# Patient Record
Sex: FEMALE | Race: WHITE | NOT HISPANIC OR LATINO | Employment: OTHER | URBAN - METROPOLITAN AREA
[De-identification: names, ages, dates, MRNs, and addresses within clinical notes are randomized per-mention and may not be internally consistent; named-entity substitution may affect disease eponyms.]

---

## 2019-12-09 ENCOUNTER — HOSPITAL ENCOUNTER (OUTPATIENT)
Facility: CLINIC | Age: 69
Setting detail: OBSERVATION
Discharge: HOME | DRG: 392 | End: 2019-12-10
Attending: EMERGENCY MEDICINE | Admitting: INTERNAL MEDICINE
Payer: COMMERCIAL

## 2019-12-09 ENCOUNTER — APPOINTMENT (OUTPATIENT)
Dept: GENERAL RADIOLOGY | Facility: CLINIC | Age: 69
DRG: 392 | End: 2019-12-09
Attending: EMERGENCY MEDICINE
Payer: COMMERCIAL

## 2019-12-09 DIAGNOSIS — R11.2 NAUSEA AND VOMITING, INTRACTABILITY OF VOMITING NOT SPECIFIED, UNSPECIFIED VOMITING TYPE: ICD-10-CM

## 2019-12-09 DIAGNOSIS — E87.1 HYPONATREMIA: ICD-10-CM

## 2019-12-09 DIAGNOSIS — I10 BENIGN ESSENTIAL HYPERTENSION: Primary | ICD-10-CM

## 2019-12-09 LAB
ALBUMIN SERPL-MCNC: 4 G/DL (ref 3.4–5)
ALBUMIN UR-MCNC: NEGATIVE MG/DL
ALP SERPL-CCNC: 88 U/L (ref 40–150)
ALT SERPL W P-5'-P-CCNC: 19 U/L (ref 0–50)
ANION GAP SERPL CALCULATED.3IONS-SCNC: 10 MMOL/L (ref 3–14)
APPEARANCE UR: CLEAR
AST SERPL W P-5'-P-CCNC: 28 U/L (ref 0–45)
BASOPHILS # BLD AUTO: 0 10E9/L (ref 0–0.2)
BASOPHILS NFR BLD AUTO: 0.3 %
BILIRUB SERPL-MCNC: 0.6 MG/DL (ref 0.2–1.3)
BILIRUB UR QL STRIP: NEGATIVE
BUN SERPL-MCNC: 10 MG/DL (ref 7–30)
CALCIUM SERPL-MCNC: 8.4 MG/DL (ref 8.5–10.1)
CHLORIDE SERPL-SCNC: 81 MMOL/L (ref 94–109)
CO2 SERPL-SCNC: 27 MMOL/L (ref 20–32)
COLOR UR AUTO: ABNORMAL
CREAT SERPL-MCNC: 0.69 MG/DL (ref 0.52–1.04)
DIFFERENTIAL METHOD BLD: ABNORMAL
EOSINOPHIL # BLD AUTO: 0 10E9/L (ref 0–0.7)
EOSINOPHIL NFR BLD AUTO: 0.3 %
ERYTHROCYTE [DISTWIDTH] IN BLOOD BY AUTOMATED COUNT: 12.4 % (ref 10–15)
GFR SERPL CREATININE-BSD FRML MDRD: 90 ML/MIN/{1.73_M2}
GLUCOSE SERPL-MCNC: 110 MG/DL (ref 70–99)
GLUCOSE UR STRIP-MCNC: NEGATIVE MG/DL
HCT VFR BLD AUTO: 40.1 % (ref 35–47)
HGB BLD-MCNC: 15 G/DL (ref 11.7–15.7)
HGB UR QL STRIP: NEGATIVE
IMM GRANULOCYTES # BLD: 0 10E9/L (ref 0–0.4)
IMM GRANULOCYTES NFR BLD: 0.3 %
KETONES UR STRIP-MCNC: 10 MG/DL
LEUKOCYTE ESTERASE UR QL STRIP: NEGATIVE
LIPASE SERPL-CCNC: 89 U/L (ref 73–393)
LYMPHOCYTES # BLD AUTO: 0.8 10E9/L (ref 0.8–5.3)
LYMPHOCYTES NFR BLD AUTO: 23.9 %
MCH RBC QN AUTO: 31.8 PG (ref 26.5–33)
MCHC RBC AUTO-ENTMCNC: 37.4 G/DL (ref 31.5–36.5)
MCV RBC AUTO: 85 FL (ref 78–100)
MONOCYTES # BLD AUTO: 0.4 10E9/L (ref 0–1.3)
MONOCYTES NFR BLD AUTO: 12.4 %
MUCOUS THREADS #/AREA URNS LPF: PRESENT /LPF
NEUTROPHILS # BLD AUTO: 2 10E9/L (ref 1.6–8.3)
NEUTROPHILS NFR BLD AUTO: 62.8 %
NITRATE UR QL: NEGATIVE
NRBC # BLD AUTO: 0 10*3/UL
NRBC BLD AUTO-RTO: 0 /100
PH UR STRIP: 6.5 PH (ref 5–7)
PLATELET # BLD AUTO: 200 10E9/L (ref 150–450)
POTASSIUM SERPL-SCNC: 2.8 MMOL/L (ref 3.4–5.3)
POTASSIUM SERPL-SCNC: 3.2 MMOL/L (ref 3.4–5.3)
PROT SERPL-MCNC: 7.5 G/DL (ref 6.8–8.8)
RBC # BLD AUTO: 4.72 10E12/L (ref 3.8–5.2)
RBC #/AREA URNS AUTO: 1 /HPF (ref 0–2)
SODIUM SERPL-SCNC: 118 MMOL/L (ref 133–144)
SODIUM SERPL-SCNC: 124 MMOL/L (ref 133–144)
SODIUM UR-SCNC: 32 MMOL/L
SOURCE: ABNORMAL
SP GR UR STRIP: 1.01 (ref 1–1.03)
SQUAMOUS #/AREA URNS AUTO: 4 /HPF (ref 0–1)
UROBILINOGEN UR STRIP-MCNC: NORMAL MG/DL (ref 0–2)
WBC # BLD AUTO: 3.2 10E9/L (ref 4–11)
WBC #/AREA URNS AUTO: 1 /HPF (ref 0–5)

## 2019-12-09 PROCEDURE — 86615 BORDETELLA ANTIBODY: CPT | Performed by: EMERGENCY MEDICINE

## 2019-12-09 PROCEDURE — 81001 URINALYSIS AUTO W/SCOPE: CPT | Performed by: EMERGENCY MEDICINE

## 2019-12-09 PROCEDURE — 80053 COMPREHEN METABOLIC PANEL: CPT | Performed by: EMERGENCY MEDICINE

## 2019-12-09 PROCEDURE — 99221 1ST HOSP IP/OBS SF/LOW 40: CPT | Mod: AI | Performed by: INTERNAL MEDICINE

## 2019-12-09 PROCEDURE — 36415 COLL VENOUS BLD VENIPUNCTURE: CPT | Performed by: INTERNAL MEDICINE

## 2019-12-09 PROCEDURE — 84132 ASSAY OF SERUM POTASSIUM: CPT | Performed by: INTERNAL MEDICINE

## 2019-12-09 PROCEDURE — 84295 ASSAY OF SERUM SODIUM: CPT | Performed by: INTERNAL MEDICINE

## 2019-12-09 PROCEDURE — G0378 HOSPITAL OBSERVATION PER HR: HCPCS

## 2019-12-09 PROCEDURE — 25800030 ZZH RX IP 258 OP 636: Performed by: EMERGENCY MEDICINE

## 2019-12-09 PROCEDURE — 25000128 H RX IP 250 OP 636: Performed by: EMERGENCY MEDICINE

## 2019-12-09 PROCEDURE — 36415 COLL VENOUS BLD VENIPUNCTURE: CPT | Performed by: EMERGENCY MEDICINE

## 2019-12-09 PROCEDURE — 96361 HYDRATE IV INFUSION ADD-ON: CPT

## 2019-12-09 PROCEDURE — 83690 ASSAY OF LIPASE: CPT | Performed by: EMERGENCY MEDICINE

## 2019-12-09 PROCEDURE — 99207 ZZC CDG-HISTORY COMP: MEETS EXP. PROB FOCUSED- DOWN CODED LACK OF PFSH: CPT | Performed by: INTERNAL MEDICINE

## 2019-12-09 PROCEDURE — 25000128 H RX IP 250 OP 636: Performed by: INTERNAL MEDICINE

## 2019-12-09 PROCEDURE — 85025 COMPLETE CBC W/AUTO DIFF WBC: CPT | Performed by: EMERGENCY MEDICINE

## 2019-12-09 PROCEDURE — 71046 X-RAY EXAM CHEST 2 VIEWS: CPT

## 2019-12-09 PROCEDURE — 25800030 ZZH RX IP 258 OP 636: Performed by: INTERNAL MEDICINE

## 2019-12-09 PROCEDURE — 96375 TX/PRO/DX INJ NEW DRUG ADDON: CPT

## 2019-12-09 PROCEDURE — 96376 TX/PRO/DX INJ SAME DRUG ADON: CPT

## 2019-12-09 PROCEDURE — 96365 THER/PROPH/DIAG IV INF INIT: CPT

## 2019-12-09 PROCEDURE — 83935 ASSAY OF URINE OSMOLALITY: CPT | Performed by: INTERNAL MEDICINE

## 2019-12-09 PROCEDURE — 25000132 ZZH RX MED GY IP 250 OP 250 PS 637: Performed by: EMERGENCY MEDICINE

## 2019-12-09 PROCEDURE — 84300 ASSAY OF URINE SODIUM: CPT | Performed by: INTERNAL MEDICINE

## 2019-12-09 PROCEDURE — 99285 EMERGENCY DEPT VISIT HI MDM: CPT | Mod: 25

## 2019-12-09 PROCEDURE — 25000125 ZZHC RX 250: Performed by: EMERGENCY MEDICINE

## 2019-12-09 RX ORDER — ONDANSETRON 4 MG/1
4 TABLET, ORALLY DISINTEGRATING ORAL EVERY 6 HOURS PRN
Status: DISCONTINUED | OUTPATIENT
Start: 2019-12-09 | End: 2019-12-10

## 2019-12-09 RX ORDER — SCOLOPAMINE TRANSDERMAL SYSTEM 1 MG/1
1 PATCH, EXTENDED RELEASE TRANSDERMAL
Status: DISCONTINUED | OUTPATIENT
Start: 2019-12-09 | End: 2019-12-10 | Stop reason: HOSPADM

## 2019-12-09 RX ORDER — ONDANSETRON 4 MG/1
4 TABLET, ORALLY DISINTEGRATING ORAL EVERY 6 HOURS PRN
Status: DISCONTINUED | OUTPATIENT
Start: 2019-12-09 | End: 2019-12-10 | Stop reason: HOSPADM

## 2019-12-09 RX ORDER — ONDANSETRON 2 MG/ML
4 INJECTION INTRAMUSCULAR; INTRAVENOUS EVERY 30 MIN PRN
Status: DISCONTINUED | OUTPATIENT
Start: 2019-12-09 | End: 2019-12-10

## 2019-12-09 RX ORDER — NALOXONE HYDROCHLORIDE 0.4 MG/ML
.1-.4 INJECTION, SOLUTION INTRAMUSCULAR; INTRAVENOUS; SUBCUTANEOUS
Status: DISCONTINUED | OUTPATIENT
Start: 2019-12-09 | End: 2019-12-10 | Stop reason: HOSPADM

## 2019-12-09 RX ORDER — POTASSIUM CL/LIDO/0.9 % NACL 10MEQ/0.1L
10 INTRAVENOUS SOLUTION, PIGGYBACK (ML) INTRAVENOUS
Status: DISCONTINUED | OUTPATIENT
Start: 2019-12-09 | End: 2019-12-09

## 2019-12-09 RX ORDER — METOCLOPRAMIDE 5 MG/1
5 TABLET ORAL EVERY 6 HOURS PRN
Status: DISCONTINUED | OUTPATIENT
Start: 2019-12-09 | End: 2019-12-10 | Stop reason: HOSPADM

## 2019-12-09 RX ORDER — POTASSIUM CHLORIDE 1.5 G/1.58G
20-40 POWDER, FOR SOLUTION ORAL
Status: DISCONTINUED | OUTPATIENT
Start: 2019-12-09 | End: 2019-12-10 | Stop reason: HOSPADM

## 2019-12-09 RX ORDER — ONDANSETRON 2 MG/ML
4 INJECTION INTRAMUSCULAR; INTRAVENOUS EVERY 6 HOURS PRN
Status: DISCONTINUED | OUTPATIENT
Start: 2019-12-09 | End: 2019-12-10

## 2019-12-09 RX ORDER — SODIUM CHLORIDE 9 MG/ML
1000 INJECTION, SOLUTION INTRAVENOUS CONTINUOUS
Status: DISCONTINUED | OUTPATIENT
Start: 2019-12-09 | End: 2019-12-10 | Stop reason: HOSPADM

## 2019-12-09 RX ORDER — POTASSIUM CHLORIDE 1500 MG/1
20-40 TABLET, EXTENDED RELEASE ORAL
Status: DISCONTINUED | OUTPATIENT
Start: 2019-12-09 | End: 2019-12-10 | Stop reason: HOSPADM

## 2019-12-09 RX ORDER — ATENOLOL 25 MG/1
25 TABLET ORAL DAILY
Status: DISCONTINUED | OUTPATIENT
Start: 2019-12-10 | End: 2019-12-10 | Stop reason: HOSPADM

## 2019-12-09 RX ORDER — SODIUM CHLORIDE 9 MG/ML
INJECTION, SOLUTION INTRAVENOUS ONCE
Status: COMPLETED | OUTPATIENT
Start: 2019-12-09 | End: 2019-12-09

## 2019-12-09 RX ORDER — POTASSIUM CHLORIDE 1.5 G/1.58G
20 POWDER, FOR SOLUTION ORAL ONCE
Status: COMPLETED | OUTPATIENT
Start: 2019-12-09 | End: 2019-12-09

## 2019-12-09 RX ORDER — SODIUM CHLORIDE 9 MG/ML
1000 INJECTION, SOLUTION INTRAVENOUS CONTINUOUS
Status: DISCONTINUED | OUTPATIENT
Start: 2019-12-09 | End: 2019-12-09

## 2019-12-09 RX ORDER — ONDANSETRON 2 MG/ML
INJECTION INTRAMUSCULAR; INTRAVENOUS
Status: DISCONTINUED
Start: 2019-12-09 | End: 2019-12-09 | Stop reason: HOSPADM

## 2019-12-09 RX ORDER — POTASSIUM CL/LIDO/0.9 % NACL 10MEQ/0.1L
10 INTRAVENOUS SOLUTION, PIGGYBACK (ML) INTRAVENOUS
Status: DISCONTINUED | OUTPATIENT
Start: 2019-12-09 | End: 2019-12-10 | Stop reason: HOSPADM

## 2019-12-09 RX ORDER — ACETAMINOPHEN 650 MG/1
650 SUPPOSITORY RECTAL EVERY 4 HOURS PRN
Status: DISCONTINUED | OUTPATIENT
Start: 2019-12-09 | End: 2019-12-10 | Stop reason: HOSPADM

## 2019-12-09 RX ORDER — PROCHLORPERAZINE 25 MG
12.5 SUPPOSITORY, RECTAL RECTAL EVERY 12 HOURS PRN
Status: DISCONTINUED | OUTPATIENT
Start: 2019-12-09 | End: 2019-12-10 | Stop reason: HOSPADM

## 2019-12-09 RX ORDER — POTASSIUM CHLORIDE 7.45 MG/ML
10 INJECTION INTRAVENOUS
Status: DISCONTINUED | OUTPATIENT
Start: 2019-12-09 | End: 2019-12-10 | Stop reason: HOSPADM

## 2019-12-09 RX ORDER — SCOLOPAMINE TRANSDERMAL SYSTEM 1 MG/1
1 PATCH, EXTENDED RELEASE TRANSDERMAL
Status: DISCONTINUED | OUTPATIENT
Start: 2019-12-09 | End: 2019-12-09

## 2019-12-09 RX ORDER — MAGNESIUM SULFATE HEPTAHYDRATE 40 MG/ML
4 INJECTION, SOLUTION INTRAVENOUS EVERY 4 HOURS PRN
Status: DISCONTINUED | OUTPATIENT
Start: 2019-12-09 | End: 2019-12-10 | Stop reason: HOSPADM

## 2019-12-09 RX ORDER — ACETAMINOPHEN 325 MG/1
650 TABLET ORAL EVERY 4 HOURS PRN
Status: DISCONTINUED | OUTPATIENT
Start: 2019-12-09 | End: 2019-12-10 | Stop reason: HOSPADM

## 2019-12-09 RX ORDER — PROCHLORPERAZINE MALEATE 5 MG
5 TABLET ORAL EVERY 6 HOURS PRN
Status: DISCONTINUED | OUTPATIENT
Start: 2019-12-09 | End: 2019-12-10 | Stop reason: HOSPADM

## 2019-12-09 RX ORDER — POTASSIUM CHLORIDE 29.8 MG/ML
20 INJECTION INTRAVENOUS
Status: DISCONTINUED | OUTPATIENT
Start: 2019-12-09 | End: 2019-12-10 | Stop reason: HOSPADM

## 2019-12-09 RX ORDER — METOCLOPRAMIDE HYDROCHLORIDE 5 MG/ML
5 INJECTION INTRAMUSCULAR; INTRAVENOUS EVERY 6 HOURS PRN
Status: DISCONTINUED | OUTPATIENT
Start: 2019-12-09 | End: 2019-12-10 | Stop reason: HOSPADM

## 2019-12-09 RX ORDER — ONDANSETRON 2 MG/ML
4 INJECTION INTRAMUSCULAR; INTRAVENOUS EVERY 6 HOURS PRN
Status: DISCONTINUED | OUTPATIENT
Start: 2019-12-09 | End: 2019-12-10 | Stop reason: HOSPADM

## 2019-12-09 RX ORDER — SIMVASTATIN 20 MG
20 TABLET ORAL DAILY
Status: DISCONTINUED | OUTPATIENT
Start: 2019-12-10 | End: 2019-12-10 | Stop reason: HOSPADM

## 2019-12-09 RX ORDER — LEVOTHYROXINE SODIUM 112 UG/1
112 TABLET ORAL DAILY
Status: DISCONTINUED | OUTPATIENT
Start: 2019-12-10 | End: 2019-12-10 | Stop reason: HOSPADM

## 2019-12-09 RX ADMIN — POTASSIUM CHLORIDE 20 MEQ: 1.5 POWDER, FOR SOLUTION ORAL at 17:31

## 2019-12-09 RX ADMIN — ONDANSETRON HYDROCHLORIDE 4 MG: 2 INJECTION, SOLUTION INTRAMUSCULAR; INTRAVENOUS at 19:51

## 2019-12-09 RX ADMIN — SODIUM CHLORIDE: 9 INJECTION, SOLUTION INTRAVENOUS at 18:54

## 2019-12-09 RX ADMIN — Medication 10 MEQ: at 23:38

## 2019-12-09 RX ADMIN — SODIUM CHLORIDE 1000 ML: 9 INJECTION, SOLUTION INTRAVENOUS at 17:32

## 2019-12-09 RX ADMIN — ONDANSETRON HYDROCHLORIDE 4 MG: 2 INJECTION, SOLUTION INTRAMUSCULAR; INTRAVENOUS at 16:13

## 2019-12-09 RX ADMIN — SODIUM CHLORIDE 1000 ML: 9 INJECTION, SOLUTION INTRAVENOUS at 22:40

## 2019-12-09 RX ADMIN — SODIUM CHLORIDE 1000 ML: 9 INJECTION, SOLUTION INTRAVENOUS at 16:13

## 2019-12-09 RX ADMIN — SCOPOLAMINE 1 PATCH: 1 PATCH TRANSDERMAL at 18:05

## 2019-12-09 RX ADMIN — Medication 10 MEQ: at 17:31

## 2019-12-09 ASSESSMENT — ENCOUNTER SYMPTOMS
HEADACHES: 1
FEVER: 1
COUGH: 1
ABDOMINAL PAIN: 0
VOMITING: 1
APPETITE CHANGE: 1
RHINORRHEA: 1
NAUSEA: 1

## 2019-12-09 ASSESSMENT — MIFFLIN-ST. JEOR: SCORE: 1116.03

## 2019-12-09 NOTE — ED PROVIDER NOTES
"  History     Chief Complaint:  Nausea    HPI   Cande Glynn is a 68 year old female with a history of hypertension, thyroid disease, and hyponatremia who presents to the emergency department today for evaluation of nausea. The patient reports five days of a productive cough and cold symptoms accompanied by four days of a suspected fever, a headache, constant, \"severe\" nausea, and gagging/dry heaving. She adds that she is unable to eat and only minimally drink due to the gagging and dry heaving. The patient endorses utilizing Pepto bismol with no relief of her symptoms. She reports a history of similar symptoms approximately three years ago for which she was hospitalized. See note below for further details. The patient denies any abdominal pain or history of lung disease. Of note, the patient received a flu shot this year.     Per chart review, the patient was admitted to the hospital from 10/19/16-10/20/16 for three days of intractable nausea and vomiting. ED work up revealed hyponatremia to 120 and hypokalemia to 3.3. Symptoms were attributed to likely acute gastroenteritis and were well controlled with scopolamine patch (failed zofran, compazine). Potassium was supplemented during her visit.     Allergies:  No Known Drug Allergies     Medications:    Tenormin  Levothroid  Hyzaar  Zocor  Tigan     Past Medical History:    Hypertension  Thyroid disease  Hyponatremia   Hypokalemia  Denies history of lung disease    Past Surgical History:    Surgical history reviewed. No pertinent surgical history.    Family History:    Family history reviewed. No pertinent family history.    Social History:  The patient was accompanied to the ED by a male .  [facility]  Marital Status:        Review of Systems   Constitutional: Positive for appetite change and fever.   HENT: Positive for congestion and rhinorrhea.    Respiratory: Positive for cough.    Gastrointestinal: Positive for nausea " "and vomiting. Negative for abdominal pain.   Neurological: Positive for headaches.   All other systems reviewed and are negative.  Patient states that she has had nausea since Friday and has been \"dry heaving\". Complains of 5/10 Headache also. Complains of dizziness and poor appetite. Denies abdominal pain.    Physical Exam     Patient Vitals for the past 24 hrs:   BP Temp Temp src Pulse Heart Rate Resp SpO2 Weight   12/09/19 2015 135/67 -- -- 59 59 -- 97 % --   12/09/19 2000 137/64 -- -- 54 53 -- 91 % --   12/09/19 1945 135/64 -- -- 57 57 -- 93 % --   12/09/19 1930 122/63 -- -- 60 65 -- 91 % --   12/09/19 1915 138/70 -- -- 60 56 -- 94 % --   12/09/19 1900 (!) 149/67 -- -- 64 56 -- 95 % --   12/09/19 1830 129/55 -- -- 55 56 -- 94 % --   12/09/19 1815 138/76 -- -- 61 56 -- 93 % --   12/09/19 1800 (!) 141/70 -- -- 56 57 -- 95 % --   12/09/19 1745 130/69 -- -- 55 58 -- 95 % --   12/09/19 1730 136/68 -- -- 58 55 -- 97 % --   12/09/19 1715 130/60 -- -- 54 -- -- 91 % --   12/09/19 1700 (!) 142/77 -- -- 59 -- -- -- --   12/09/19 1645 (!) 144/65 -- -- 56 -- -- 98 % --   12/09/19 1630 134/60 -- -- 50 -- -- 96 % --   12/09/19 1625 -- -- -- -- -- -- 96 % --   12/09/19 1620 -- -- -- -- -- -- 96 % --   12/09/19 1615 (!) 144/67 -- -- 54 -- -- 91 % --   12/09/19 1522 125/73 97.4  F (36.3  C) Temporal 63 -- 16 99 % 60.1 kg (132 lb 7.9 oz)     Physical Exam  GEN: patient appears tired, SO at the bedside  HEAD: atraumatic, normocephalic  EYES: pupils reactive, conjunctivae normal  ENT: TMs flat and white bilaterally, oropharynx normal with no erythema or exudate, mucus membranes dry  NECK: no cervical LAD, no meningeal signs  RESPIRATORY: no tachypnea, breath sounds clear to auscultation (no rales, wheezes, rhonchi)  CVS: normal S1/S2, I/VI systolic ej murmurs, no rubs/gallops  ABDOMEN: soft, nontender, no masses or organomegaly, no rebound, decreased bowel sounds  BACK: no spinal tenderness  EXTREMITIES: intact pulses x 2 (full ROM " at the joints), no edema  MUSCULOSKELETAL: no deformities  SKIN: warm and dry, no acute rashes  NEURO: GCS 15, cranial nerves intact.  Motor- moves all 4 extremities  Overall symmetrical exam  HEME: no bruising or petechiae/contusions  LYMPH: no lymphadenopathy    Emergency Department Course     Imaging:  Radiology findings were communicated with the patient who voiced understanding of the findings.    Chest XR,  PA & LAT  Lungs hyperinflated but clear. Otherwise negative Chest.  Reading per radiology    Laboratory:  Laboratory findings were communicated with the patient who voiced understanding of the findings.    CBC: WBC 3.2 (L), HGB 15.0,   CMP:  (LL), Potassium 2.8 (L), Chloride 81 (L), Glucose 110 (H), Calcium 8.4 (L) o/w WNL (Creatinine 0.68)  Lipase: 89  Bordetalla pertussis Mynor IgG with Reflex: pending    UA with Microscopic: Ketones 10 (A), Squamous Epithelial 4 (H), Mucous present (A), o/w WNL     Interventions:  1613 NS 1000 ml IV  1613 Zofran 4 mg IV  1731 Klor-con 20 mEq Oral  1731 Potassium Chloride 10 mEq IV  1732 NS 1000 ml IV  1805 Scopolamine 1 patch Transdermal  1854 NS 1000 ml IV  1951 Zofran 4 mg IV  Heplock  Cardiac/Sp02 monitoring    Emergency Department Course:    1554 Nursing notes and vitals reviewed.    1603 I performed an exam of the patient as documented above.     1612 IV was inserted and blood was drawn for laboratory testing, results above.    1626 The patient was sent for a chest xray while in the emergency department, results above.     1731 The patient provided a urine sample here in the emergency department. This was sent for laboratory testing, findings above.    1908 I spoke with Dr. Sharma of the hospitalist service from [facility] regarding patient's presentation, findings, and plan of care.    Findings and plan explained to the Patient who consents to admission. Discussed the patient with Dr. Sharma, who will admit the patient to an observation bed for  "further monitoring, evaluation, and treatment.    BP (!) 144/56 (BP Location: Left arm)   Pulse 59   Temp 98.2  F (36.8  C) (Oral)   Resp 14   Ht 1.626 m (5' 4\")   Wt 59.6 kg (131 lb 6.4 oz)   SpO2 94%   BMI 22.55 kg/m        Impression & Plan      Medical Decision Making:  Cande Glynn is a 68 year old female who has had to be admitted with dizziness in the past and additional nausea. The patient was discharged back in 2016 with a gastroenteritis, hyponatremia, and hypokalemia. The patient has been sick and coughing for the last few days and has post-tussive emesis. She did appear weak and tired. An IV was placed and labs were sent. Her white cell count is 3.2. Potassium is 2.8, which is markedly low. Sodium is 118. Lipase is negative, showing no pancreatitis. IV fluids were administrated, plus oral and IV potassium repletion.  Zofran was given for nausea with no relief.  Scopolamine provided major improved.    The patient was able to urinate for us.  Chest xray does not show any abnormality. I have sent off a pertussis, which is currently pending. The patient does not necessarily have flu criteria. She is getting IV fluids in addition to potassium orally and IV. She will be admitted for observation.     Addendum: No infection noted on the urine, small amount of ketones.  Patient improved but will be admitted for hydration and observation.    Diagnosis:    ICD-10-CM    1. Hyponatremia E87.1 UA with Microscopic     Bordetalla pertussis Teresa IgG with Reflex     Bordetalla pertussis Teresa IgG with Reflex     Bordetalla pertussis Teresa IgG with Reflex     CANCELED: Bordetalla pertussis Teresa IgG with Reflex     CANCELED: Bordetalla pertussis Teresa IgG with Reflex   2. Nausea and vomiting, intractability of vomiting not specified, unspecified vomiting type R11.2      Disposition:   The patient is admitted into the care of Dr. Sharma.    Scribe Disclosure:  Rosalina BELTRÁN, am serving as a scribe at 3:57 PM on 12/9/2019 " to document services personally performed by Alana Small MD based on my observations and the provider's statements to me.    [facility] EMERGENCY DEPARTMENT       Alana Small MD  12/09/19 [id number]

## 2019-12-09 NOTE — ED TRIAGE NOTES
"Patient states that she has had nausea since Friday and has bee \"dry heaving\". Complains of 5/10 Headache also. Complains of dizziness and poor appetite. Denies abdominal pain.  "

## 2019-12-10 VITALS
DIASTOLIC BLOOD PRESSURE: 45 MMHG | HEIGHT: 64 IN | HEART RATE: 51 BPM | WEIGHT: 131.4 LBS | OXYGEN SATURATION: 94 % | SYSTOLIC BLOOD PRESSURE: 124 MMHG | TEMPERATURE: 97.6 F | RESPIRATION RATE: 16 BRPM | BODY MASS INDEX: 22.43 KG/M2

## 2019-12-10 LAB
ANION GAP SERPL CALCULATED.3IONS-SCNC: 7 MMOL/L (ref 3–14)
BUN SERPL-MCNC: 7 MG/DL (ref 7–30)
CALCIUM SERPL-MCNC: 7.7 MG/DL (ref 8.5–10.1)
CHLORIDE SERPL-SCNC: 95 MMOL/L (ref 94–109)
CO2 SERPL-SCNC: 25 MMOL/L (ref 20–32)
CREAT SERPL-MCNC: 0.63 MG/DL (ref 0.52–1.04)
GFR SERPL CREATININE-BSD FRML MDRD: >90 ML/MIN/{1.73_M2}
GLUCOSE SERPL-MCNC: 89 MG/DL (ref 70–99)
OSMOLALITY UR: 247 MMOL/KG (ref 100–1200)
POTASSIUM SERPL-SCNC: 3.4 MMOL/L (ref 3.4–5.3)
SODIUM SERPL-SCNC: 126 MMOL/L (ref 133–144)
SODIUM SERPL-SCNC: 126 MMOL/L (ref 133–144)
SODIUM SERPL-SCNC: 127 MMOL/L (ref 133–144)

## 2019-12-10 PROCEDURE — 96361 HYDRATE IV INFUSION ADD-ON: CPT

## 2019-12-10 PROCEDURE — 96376 TX/PRO/DX INJ SAME DRUG ADON: CPT

## 2019-12-10 PROCEDURE — 25800030 ZZH RX IP 258 OP 636: Performed by: INTERNAL MEDICINE

## 2019-12-10 PROCEDURE — G0378 HOSPITAL OBSERVATION PER HR: HCPCS

## 2019-12-10 PROCEDURE — 84295 ASSAY OF SERUM SODIUM: CPT | Performed by: PHYSICIAN ASSISTANT

## 2019-12-10 PROCEDURE — 36415 COLL VENOUS BLD VENIPUNCTURE: CPT | Performed by: INTERNAL MEDICINE

## 2019-12-10 PROCEDURE — 84295 ASSAY OF SERUM SODIUM: CPT | Performed by: INTERNAL MEDICINE

## 2019-12-10 PROCEDURE — 80048 BASIC METABOLIC PNL TOTAL CA: CPT | Performed by: INTERNAL MEDICINE

## 2019-12-10 PROCEDURE — 36415 COLL VENOUS BLD VENIPUNCTURE: CPT | Performed by: PHYSICIAN ASSISTANT

## 2019-12-10 PROCEDURE — 25000128 H RX IP 250 OP 636: Performed by: INTERNAL MEDICINE

## 2019-12-10 PROCEDURE — 25000132 ZZH RX MED GY IP 250 OP 250 PS 637: Performed by: INTERNAL MEDICINE

## 2019-12-10 PROCEDURE — 99217 ZZC OBSERVATION CARE DISCHARGE: CPT | Performed by: PHYSICIAN ASSISTANT

## 2019-12-10 RX ORDER — ONDANSETRON 4 MG/1
4 TABLET, ORALLY DISINTEGRATING ORAL EVERY 6 HOURS PRN
Qty: 10 TABLET | Refills: 0 | Status: ON HOLD | OUTPATIENT
Start: 2019-12-10 | End: 2019-12-15

## 2019-12-10 RX ORDER — LOSARTAN POTASSIUM 100 MG/1
100 TABLET ORAL DAILY
Qty: 30 TABLET | Refills: 0 | Status: SHIPPED | OUTPATIENT
Start: 2019-12-11

## 2019-12-10 RX ADMIN — SODIUM CHLORIDE 1000 ML: 9 INJECTION, SOLUTION INTRAVENOUS at 00:59

## 2019-12-10 RX ADMIN — ATENOLOL 25 MG: 25 TABLET ORAL at 08:16

## 2019-12-10 RX ADMIN — SIMVASTATIN 20 MG: 20 TABLET, FILM COATED ORAL at 08:16

## 2019-12-10 RX ADMIN — Medication 10 MEQ: at 03:50

## 2019-12-10 RX ADMIN — Medication 10 MEQ: at 02:24

## 2019-12-10 RX ADMIN — LEVOTHYROXINE SODIUM 112 MCG: 112 TABLET ORAL at 08:15

## 2019-12-10 RX ADMIN — Medication 10 MEQ: at 00:57

## 2019-12-10 NOTE — DISCHARGE SUMMARY
Discharge Summary  Hospitalist Service    Cande Glynn MRN# [id number]   YOB: 1950 Age: 68 year old     Date of Admission:  12/9/2019  Date of Discharge:  12/10/2019  Admitting Physician: Guevara Sharma MD  Discharge Physician: Ana Maria Malloy PA-C  Discharging Service: Hospitalist Service     Primary Provider: Vivien Wagner  Primary Care Physician Phone Number: [phone]         Discharge Diagnoses/Problem Oriented Hospital Course (Providers):    Cande Glynn was admitted on 12/9/2019 by Guevara Sharma MD and I would refer you to their history and physical.  Briefly, patient presented with persistent nausea without vomiting or significant abdominal pain.  Electrolyte panel showed a low sodium at 118 and low potassium at 2.8 likely contributing to her symptoms.  She was given normal saline, electrolyte replacement and antiemetics with improvement of her symptoms.  The following problems were addressed during her hospitalization:      #Severe hyponatremia  Presented with persistent nausea for multiple days without vomiting or abdominal pain.  She continued to take her blood pressure medication which included hydrochlorothiazide during this time likely contributing to the hyponatremia.  Her sodium on admission was 118 but with normal saline and antiemetics and improved to 126.  She was feeling better at the time of discharge so her diuretic was held and she was told to follow-up with her primary care doctor later this week for repeat sodium level.    #Nausea  Likely related to hyponatremia and hypokalemia.  Symptoms improved with nausea medication.  She was discharged with a prescription of Zofran    #Hypokalemia  This was replaced with improvement of her symptoms    #Cough  She is a daily tobacco user and also presented with cough productive of clear sputum.  She was afebrile without elevation or WBC.  Chest x-ray appeared clear so no antibiotic treatment was  started.    #Hypertension  On admission patient takes a combination losartan/hydrochlorothiazide she was discontinued given the hyponatremia.  -Discharged with losartan 100 mg prescription and she will continue her atenolol daily    No other work-up was done on this admission and she was discharged home on all of her other medications           Code Status:      Full Code        Brief Hospital Stay Summary Sent Home With Patient in AVS:        Reason for your hospital stay      You were admitted for concerns of persistent nausea which caused her   sodium level to be low.  We treated your nausea with antiemetics and your   sodium level with normal saline.  Your symptoms are much improved but your   sodium is still slightly low at 126 when normal is 135.  We would like you   to stop taking your losartan/hydrochlorothiazide blood pressure medication   and instead take the new losartan medication we sent to the pharmacy.  The   hydrochlorothiazide portion can cause low sodium levels in the setting of   dehydration.    We recommend you continue to drink fluids throughout the day and follow-up   with your clinic at the end of the week for a repeat sodium level.                           Pending Results:        Unresulted Labs Ordered in the Past 30 Days of this Admission     Date and Time Order Name Status Description    12/9/2019 1810 Bordetalla pertussis Teresa IgG with Reflex In process             Discharge Instructions and Follow-Up:      Follow-up Appointments     Follow-up and recommended labs and tests       Follow-up with your primary care clinic at the end of the week for a   repeat BMP               Discharge Disposition:      Discharged to home         Discharge Medications:        Current Discharge Medication List      START taking these medications    Details   losartan (COZAAR) 100 MG tablet Take 1 tablet (100 mg) by mouth daily  Qty: 30 tablet, Refills: 0    Associated Diagnoses: Benign essential hypertension  "     ondansetron (ZOFRAN-ODT) 4 MG ODT tab Take 1 tablet (4 mg) by mouth every 6 hours as needed for nausea or vomiting  Qty: 10 tablet, Refills: 0    Associated Diagnoses: Nausea and vomiting, intractability of vomiting not specified, unspecified vomiting type         CONTINUE these medications which have NOT CHANGED    Details   atenolol (TENORMIN) 25 MG tablet Take 25 mg by mouth daily      Levothyroxine Sodium (LEVOTHROID PO) Take 112 mcg by mouth daily       simvastatin (ZOCOR) 20 MG tablet Take 20 mg by mouth daily         STOP taking these medications       losartan-hydrochlorothiazide (HYZAAR) 100-12.5 MG per tablet Comments:   Reason for Stopping:                 Allergies:       No Known Allergies        Consultations This Hospital Stay:      No consultations were requested during this admission         Condition and Physical on Discharge:      Discharge condition: Stable   Vitals: Blood pressure 133/48, pulse 55, temperature 98.5  F (36.9  C), temperature source Oral, resp. rate 16, height 1.626 m (5' 4\"), weight 59.6 kg (131 lb 6.4 oz), SpO2 92 %.     Constitutional:  Alert and oriented x3   Lungs:  Clear to auscultation bilaterally   Cardiovascular:  RRR with no murmur   Abdomen:  Bowel sounds are present with no tenderness   Skin:  No rash or open sores are noted   Other:          Discharge Time:      Less than 30 minutes.        Image Results From This Hospital Stay (For Non-EPIC Providers):        Results for orders placed or performed during the hospital encounter of 12/09/19   Chest XR,  PA & LAT    Narrative    EXAM: XR CHEST 2 VW  LOCATION: [facility]  DATE/TIME: 12/9/2019 4:55 PM    INDICATION: Cough  COMPARISON: None.      Impression    IMPRESSION: Lungs hyperinflated but clear. Otherwise negative Chest.           Most Recent Lab Results In EPIC (For Non-EPIC Providers):    Most Recent 3 CBC's:  Recent Labs   Lab Test 12/09/19  1612 10/19/16  1503   WBC 3.2* 9.0   HGB 15.0 15.4 "   MCV 85 84    277      Most Recent 3 BMP's:  Recent Labs   Lab Test 12/10/19  1050 12/10/19  0512 12/10/19  0148 12/09/19  2147 12/09/19  1612 10/20/16  0655   * 127* 126* 124* 118* 127*   POTASSIUM  --  3.4  --  3.2* 2.8* 3.2*   CHLORIDE  --  95  --   --  81* 95   CO2  --  25  --   --  27 27   BUN  --  7  --   --  10 9   CR  --  0.63  --   --  0.69 0.80   ANIONGAP  --  7  --   --  10 5   ZORAIDA  --  7.7*  --   --  8.4* 7.4*   GLC  --  89  --   --  110* 95     Most Recent 3 Troponin's:No lab results found.  Most Recent 3 INR's:No lab results found.  Most Recent 2 LFT's:  Recent Labs   Lab Test 12/09/19  1612   AST 28   ALT 19   ALKPHOS 88   BILITOTAL 0.6     Most Recent Cholesterol Panel:No lab results found.  Most Recent 6 Bacteria Isolates From Any Culture (See EPIC Reports for Culture Details):No lab results found.  Most Recent TSH, T4 and HgbA1c: No lab results found.

## 2019-12-10 NOTE — PHARMACY-ADMISSION MEDICATION HISTORY
Admission medication history interview status for this patient is complete. See [facility] admission navigator for allergy information, prior to admission medications and immunization status.     Medication history interview source(s):Patient  Medication history resources (including written lists, pill bottles, clinic record):None  Primary pharmacy: [facility]    Changes made to PTA medication list:  Added: None  Deleted: Scopalamine patch; Trimethobenzamide;   Changed: None    Actions taken by pharmacist (provider contacted, etc):None     Additional medication history information:None    Medication reconciliation/reorder completed by provider prior to medication history?  No     Do you take OTC medications (eg tylenol, ibuprofen, fish oil, eye/ear drops, etc)? No         Prior to Admission medications    Medication Sig Last Dose Taking? Auth Provider   atenolol (TENORMIN) 25 MG tablet Take 25 mg by mouth daily 12/9/2019 at AM Yes Unknown, Entered By History   Levothyroxine Sodium (LEVOTHROID PO) Take 112 mcg by mouth daily  12/9/2019 at AM Yes Reported, Patient   losartan-hydrochlorothiazide (HYZAAR) 100-12.5 MG per tablet Take 1 tablet by mouth daily 12/9/2019 at AM Yes Unknown, Entered By History   simvastatin (ZOCOR) 20 MG tablet Take 20 mg by mouth daily 12/9/2019 at AM Yes Unknown, Entered By History

## 2019-12-10 NOTE — H&P
Hospitalist Observation Admission Note([facility])    Name: Cande Glynn    MRN: [id number]          YOB: 1950    Age: 68 year old    Date of admission: 12/9/2019    Primary care provider: Vivien Wagner  Admitting physician:Guevara Sharma MD                 Assessment      Brief summary of admission assessment:Cande Glynn is a 68 year old  female with a significant past medical history of hypertension and thyroid disease who presents with persistent nausea since Friday.  Patient did not vomit but she was not able to take enough oral intake since onset of her nausea.  She has been having symptoms of upper respiratory infection over the last several days as well.    ED evaluation revealed stable vital signs but her sodium level was 118 and potassium was 2.8 with normal kidney function.    Hospital service was consulted for admission to the hospital.        #1.  Upper respiratory infection: Improved  #2.  Nausea and decreased oral intake  #3.  Severe hyponatremia: Patient has similar history of hyponatremia in 2016.  Suspect this is dehydration related to nausea and decreased intake as well as diuretic therapy  #4.  Hypokalemia  #5.  Tobacco smoking          Reason for admission:    Observation admission for monitoring and management of hyponatremia and dehydration    Observation Goals: Patient to remain stable and sodium improved with hydration.         Comorbid conditions:      Hypertension on atenolol, Hyzaar    Dyslipidemia on simvastatin    Hypothyroidism on levothyroxine             Plan     > Admission Status:Admit to observation     >Care plan:  --Admit to observation, telemetry monitoring, continue IV saline and monitor serum sodium closely every 4 hours  --Hold Hyzaar  --Nausea and vomiting treatment protocol  --Scopolamine patch which worked well 5 years ago  >Supportive care:Nausea and vomiting control measures    >Diet:Diet advanced    >Activity:Advance activity  as tolerated    >Education/Counseling :Discussed treatment plan with the patient    >Consults:none     >VTE prophylactic measures:prophylaxis against venous thromboembolism    >Therapies:none       >Additional orders    --Care plan discussed with the patient/family and agreed to care plan   --Patient will be transferred to care of hospitalist attending for further evaluation and management as appropriate   --Old medical orders reviewed   --imaging result independently reviewed by me     (See orders placed for this visit by me )     - Home medication reviewed and will be continued as appropriate once pharmacy reconciliation is completed             Code Status:     Full Code         Disposition:       >anticipate discharge to home and Anticipate discharge tomorrow            Disclaimer: This note consists of symbols derived from keyboarding, dictation and/or voice recognition software. As a result, there may be errors in the script that have gone undetected. Please consider this when interpreting information found in this chart.             Chief Complaint:     Nausea     History is obtained from the patient          History of Present Illness:   This patient is a 68 year old  female with a significant past medical history of hypertension hypothyroidism who presents with the following condition requiring a hospital admission:    Hyponatremia dehydration  68-year-old female history of hypertension on Hyzaar and atenolol, hypothyroidism who was history of hyponatremia in 2016 presents with persistent nausea since Friday.  This followed approximately infection with nasal congestion cough which improved.  Patient denies any fever.  She felt some dizziness but denies any chest pain.  She is been having some headaches associated with this.  She denies any diarrhea or vomiting.  She presents the emergency department and evaluation revealed significantly low serum sodium and potassium.            Past Medical History:      Past Medical History:   Diagnosis Date     Hypertension      Thyroid disease             Past Surgical History:   No past surgical history on file.          Social History:     Social History     Tobacco Use     Smoking status: Not on file   Substance Use Topics     Alcohol use: Not on file             Family History:   Reviewed and non contributory        Allergies:   No Known Allergies          Medications:         Prior to Admission medications    Medication Sig Last Dose Taking? Auth Provider   atenolol (TENORMIN) 25 MG tablet Take 25 mg by mouth daily 12/9/2019 at AM Yes Unknown, Entered By History   Levothyroxine Sodium (LEVOTHROID PO) Take 112 mcg by mouth daily  12/9/2019 at AM Yes Reported, Patient   losartan-hydrochlorothiazide (HYZAAR) 100-12.5 MG per tablet Take 1 tablet by mouth daily 12/9/2019 at AM Yes Unknown, Entered By History   simvastatin (ZOCOR) 20 MG tablet Take 20 mg by mouth daily 12/9/2019 at AM Yes Unknown, Entered By History          Review of Systems:     A Comprehensive greater than 10 system review of systems was carried out.  Pertinent positives and negatives are noted above in HPI.  Otherwise negative for contributory information.              Physical Exam:     Vitals were reviewed    Temp:  [97.4  F (36.3  C)] 97.4  F (36.3  C)  Pulse:  [50-63] 55  Heart Rate:  [55-58] 56  Resp:  [16] 16  BP: (125-144)/(55-77) 129/55  SpO2:  [91 %-99 %] 94 %      GEN:   Alert, oriented x 3, appears comfortable, NAD.  NECK:Supple ,no mass or thyromegaly   HEENT:   Normocephalic/atraumatic, no scleral icterus, no nasal discharge, mouth moist.  CV:   Regular rate and rhythm, no murmur or JVD.  S1 + S2 noted, no S3 or S4.  LUNGS:   Clear to auscultation bilaterally without rales/rhonchi/wheezing/retractions.  Symmetric chest rise on inhalation noted.  ABD:  Active bowel sounds, soft, non-tender/non-distended.  No rebound/guarding/rigidity.  EXT:  No edema.  No cyanosis.  No joint synovitis  noted.  SKIN:  Dry to touch, no exanthems noted in the visualized areas.  Neurologic:Grossly intact,non focal     Neuropsychiatric:  General: normal, calm and normal eye contact  Level of consciousness: alert / normal  Affect: normal  Orientation: oriented to self, place, time and situation           Data:       All laboratory and imaging data in the past 24 hours reviewed     Results for orders placed or performed during the hospital encounter of 12/09/19   CBC with platelets differential     Status: Abnormal   Result Value Ref Range    WBC 3.2 (L) 4.0 - 11.0 10e9/L    RBC Count 4.72 3.8 - 5.2 10e12/L    Hemoglobin 15.0 11.7 - 15.7 g/dL    Hematocrit 40.1 35.0 - 47.0 %    MCV 85 78 - 100 fl    MCH 31.8 26.5 - 33.0 pg    MCHC 37.4 (H) 31.5 - 36.5 g/dL    RDW 12.4 10.0 - 15.0 %    Platelet Count 200 150 - 450 10e9/L    Diff Method Automated Method     % Neutrophils 62.8 %    % Lymphocytes 23.9 %    % Monocytes 12.4 %    % Eosinophils 0.3 %    % Basophils 0.3 %    % Immature Granulocytes 0.3 %    Nucleated RBCs 0 0 /100    Absolute Neutrophil 2.0 1.6 - 8.3 10e9/L    Absolute Lymphocytes 0.8 0.8 - 5.3 10e9/L    Absolute Monocytes 0.4 0.0 - 1.3 10e9/L    Absolute Eosinophils 0.0 0.0 - 0.7 10e9/L    Absolute Basophils 0.0 0.0 - 0.2 10e9/L    Abs Immature Granulocytes 0.0 0 - 0.4 10e9/L    Absolute Nucleated RBC 0.0    Comprehensive metabolic panel     Status: Abnormal   Result Value Ref Range    Sodium 118 (LL) 133 - 144 mmol/L    Potassium 2.8 (L) 3.4 - 5.3 mmol/L    Chloride 81 (L) 94 - 109 mmol/L    Carbon Dioxide 27 20 - 32 mmol/L    Anion Gap 10 3 - 14 mmol/L    Glucose 110 (H) 70 - 99 mg/dL    Urea Nitrogen 10 7 - 30 mg/dL    Creatinine 0.69 0.52 - 1.04 mg/dL    GFR Estimate 90 >60 mL/min/[1.73_m2]    GFR Estimate If Black >90 >60 mL/min/[1.73_m2]    Calcium 8.4 (L) 8.5 - 10.1 mg/dL    Bilirubin Total 0.6 0.2 - 1.3 mg/dL    Albumin 4.0 3.4 - 5.0 g/dL    Protein Total 7.5 6.8 - 8.8 g/dL    Alkaline Phosphatase 88 40  - 150 U/L    ALT 19 0 - 50 U/L    AST 28 0 - 45 U/L   Lipase     Status: None   Result Value Ref Range    Lipase 89 73 - 393 U/L   UA with Microscopic     Status: Abnormal   Result Value Ref Range    Color Urine Light Yellow     Appearance Urine Clear     Glucose Urine Negative NEG^Negative mg/dL    Bilirubin Urine Negative NEG^Negative    Ketones Urine 10 (A) NEG^Negative mg/dL    Specific Gravity Urine 1.008 1.003 - 1.035    Blood Urine Negative NEG^Negative    pH Urine 6.5 5.0 - 7.0 pH    Protein Albumin Urine Negative NEG^Negative mg/dL    Urobilinogen mg/dL Normal 0.0 - 2.0 mg/dL    Nitrite Urine Negative NEG^Negative    Leukocyte Esterase Urine Negative NEG^Negative    Source Midstream Urine     WBC Urine 1 0 - 5 /HPF    RBC Urine 1 0 - 2 /HPF    Squamous Epithelial /HPF Urine 4 (H) 0 - 1 /HPF    Mucous Urine Present (A) NEG^Negative /LPF            Recent Results (from the past 48 hour(s))   Chest XR,  PA & LAT    Narrative    EXAM: XR CHEST 2 VW  LOCATION: [facility]  DATE/TIME: 12/9/2019 4:55 PM    INDICATION: Cough  COMPARISON: None.      Impression    IMPRESSION: Lungs hyperinflated but clear. Otherwise negative Chest.      Patient`s old medical records reviewed and case discussed with the ED physician.    ED course-Reviewed

## 2019-12-10 NOTE — ED NOTES
"[facility]  ED Nurse Handoff Report    Cande Glynn is a 68 year old female   ED Chief complaint: Nausea  . ED Diagnosis:   Final diagnoses:   Hyponatremia   Nausea and vomiting, intractability of vomiting not specified, unspecified vomiting type     Allergies: No Known Allergies    Code Status:  Activity level - Baseline/Home:  Independent. Activity Level - Current:   Independent. Lift room needed: No. Bariatric: No   Needed: No   Isolation: Yes. Infection: r/o Pertussis .     Vital Signs:   Vitals:    12/09/19 1715 12/09/19 1730 12/09/19 1745 12/09/19 1800   BP: 130/60 136/68 130/69 (!) 141/70   Pulse: 54 58 55 56   Resp:       Temp:       TempSrc:       SpO2: 91% 97% 95% 95%   Weight:           Cardiac Rhythm:  ,      Pain level:    Patient confused: No. Patient Falls Risk: No.   Elimination Status: Has voided   Patient Report - Initial Complaint: Cough, nausea. Focused Assessment:  Cande Glynn is a 68 year old female with a history of hypertension, thyroid disease, and hyponatremia who presents to the emergency department today for evaluation of nausea. The patient reports five days of a productive cough and cold symptoms accompanied by four days of a suspected fever, a headache, constant, \"severe\" nausea, and gagging/dry heaving. She adds that she is unable to eat and only minimally drink due to the gagging and dry heaving. The patient endorses utilizing Pepto bismol with no relief of her symptoms. She reports a history of similar symptoms approximately three years ago for which she was hospitalized. See note below for further details. The patient denies any abdominal pain or history of lung disease. Of note, the patient received a flu shot this year.     16:19 Gastrointestinal Gastrointestinal - GI WDL: -WDL except; nausea and vomiting  Nausea/Vomiting Signs/Symptoms: nausea continuous; dry-heaving; emesis  AD     16:19 Neurological Cognitive - Cognitive/Neuro/Behavioral WDL: WDL "   [place] Coma Scale - Best Eye Response: 4-->(E4) spontaneous  Best Motor Response: 6-->(M6) obeys commands  Best Verbal Response: 5-->(V5) oriented  [place] Coma Scale Score: 15   Neuro - Additional Documentation: Headache Assessment (Row)   Additional Neuro Documentation - Headache: Nausea; Photophobic            Per chart review, the patient was admitted to the hospital from 10/19/16-10/20/16 for three days of intractable nausea and vomiting. ED work up revealed hyponatremia to 120 and hypokalemia to 3.3. Symptoms were attributed to likely acute gastroenteritis and were well controlled with scopolamine patch (failed zofran, compazine). Potassium was supplemented during her visit.      Tests Performed: Chest Xray,Blood Work. Abnormal Results:   Labs Ordered and Resulted from Time of ED Arrival Up to the Time of Departure from the ED   CBC WITH PLATELETS DIFFERENTIAL - Abnormal; Notable for the following components:       Result Value    WBC 3.2 (*)     MCHC 37.4 (*)     All other components within normal limits   COMPREHENSIVE METABOLIC PANEL - Abnormal; Notable for the following components:    Sodium 118 (*)     Potassium 2.8 (*)     Chloride 81 (*)     Glucose 110 (*)     Calcium 8.4 (*)     All other components within normal limits   ROUTINE UA WITH MICROSCOPIC - Abnormal; Notable for the following components:    Ketones Urine 10 (*)     Squamous Epithelial /HPF Urine 4 (*)     Mucous Urine Present (*)     All other components within normal limits   LIPASE   BORDETALLA PERTUSSIS DEBBY IGG WITH REFLEX   PERIPHERAL IV CATHETER     Chest XR,  PA & LAT   Final Result   IMPRESSION: Lungs hyperinflated but clear. Otherwise negative Chest.           Treatments provided: Fluids, Medication (see MAR), Potassium   Family Comments:  in room  OBS brochure/video discussed/provided to patient:  Yes  ED Medications:   Medications   ondansetron (ZOFRAN) 2 MG/ML injection (  Not Given 12/9/19 1613)   ondansetron (ZOFRAN)  injection 4 mg (4 mg Intravenous Given 12/9/19 1613)   potassium chloride 10 mEq in 100 mL intermittent infusion with 10 mg lidocaine (0 mEq Intravenous Stopped 12/9/19 1854)   scopolamine (TRANSDERM) 72 hr patch 1 patch (1 patch Transdermal Given 12/9/19 1805)     And   scopolamine (TRANSDERM-SCOP) Patch in Place ( Transdermal Given 12/9/19 1806)     And   scopolamine (TRANSDERM-SCOP) patch REMOVAL (has no administration in time range)   0.9% sodium chloride BOLUS (0 mLs Intravenous Stopped 12/9/19 1847)   0.9% sodium chloride BOLUS (0 mLs Intravenous Stopped 12/9/19 1731)   potassium chloride (KLOR-CON) Packet 20 mEq (20 mEq Oral Given 12/9/19 1731)   sodium chloride 0.9% infusion ( Intravenous New Bag 12/9/19 1854)     Drips infusing:  Yes  For the majority of the shift, the patient's behavior Green. Interventions performed were NA.     Severe Sepsis OR Septic Shock Diagnosis Present: No      ED Nurse Name/Phone Number: Meli Mcclure RN,   5:59 PM    RECEIVING UNIT ED HANDOFF REVIEW    Above ED Nurse Handoff Report was reviewed: Yes  Reviewed by: Lona Matthew RN on December 9, 2019 at 8:17 PM

## 2019-12-10 NOTE — PROGRESS NOTES
Patient serum sodium improved to 124 from 118 on admission.    She is currently getting 75 cc an hour of normal saline.  Next draw is 2 in the morning.  Plan is to decrease the fluid rate to 50 cc an hour of normal saline.  Care signed out to overnight hospitalist.

## 2019-12-10 NOTE — PROGRESS NOTES
Crosscover:  Na increased to 126 at 1:48.  Na increasing at appropriate rate of 0.5 mmol/L per hour.  Continue NS at 50cc/hr.

## 2019-12-11 LAB — B PERT IGG SER IA-ACNC: 0.76 IV

## 2019-12-12 ENCOUNTER — HOSPITAL ENCOUNTER (INPATIENT)
Facility: CLINIC | Age: 69
LOS: 3 days | Discharge: HOME | DRG: 392 | End: 2019-12-15
Attending: EMERGENCY MEDICINE | Admitting: INTERNAL MEDICINE
Payer: COMMERCIAL

## 2019-12-12 ENCOUNTER — APPOINTMENT (OUTPATIENT)
Dept: GENERAL RADIOLOGY | Facility: CLINIC | Age: 69
DRG: 392 | End: 2019-12-12
Attending: EMERGENCY MEDICINE
Payer: COMMERCIAL

## 2019-12-12 DIAGNOSIS — E87.6 HYPOKALEMIA: ICD-10-CM

## 2019-12-12 DIAGNOSIS — R94.31 PROLONGED Q-T INTERVAL ON ECG: ICD-10-CM

## 2019-12-12 DIAGNOSIS — R11.0 NAUSEA: ICD-10-CM

## 2019-12-12 DIAGNOSIS — E87.1 HYPONATREMIA: ICD-10-CM

## 2019-12-12 DIAGNOSIS — I44.7 LBBB (LEFT BUNDLE BRANCH BLOCK): ICD-10-CM

## 2019-12-12 LAB
ALBUMIN SERPL-MCNC: 3.4 G/DL (ref 3.4–5)
ALP SERPL-CCNC: 78 U/L (ref 40–150)
ALT SERPL W P-5'-P-CCNC: 19 U/L (ref 0–50)
ANION GAP SERPL CALCULATED.3IONS-SCNC: 8 MMOL/L (ref 3–14)
AST SERPL W P-5'-P-CCNC: 23 U/L (ref 0–45)
BASOPHILS # BLD AUTO: 0 10E9/L (ref 0–0.2)
BASOPHILS NFR BLD AUTO: 0.2 %
BILIRUB SERPL-MCNC: 0.3 MG/DL (ref 0.2–1.3)
BUN SERPL-MCNC: 5 MG/DL (ref 7–30)
CALCIUM SERPL-MCNC: 7.9 MG/DL (ref 8.5–10.1)
CHLORIDE SERPL-SCNC: 94 MMOL/L (ref 94–109)
CO2 SERPL-SCNC: 26 MMOL/L (ref 20–32)
CREAT SERPL-MCNC: 0.64 MG/DL (ref 0.52–1.04)
DIFFERENTIAL METHOD BLD: NORMAL
EOSINOPHIL # BLD AUTO: 0 10E9/L (ref 0–0.7)
EOSINOPHIL NFR BLD AUTO: 0 %
ERYTHROCYTE [DISTWIDTH] IN BLOOD BY AUTOMATED COUNT: 12.6 % (ref 10–15)
GFR SERPL CREATININE-BSD FRML MDRD: >90 ML/MIN/{1.73_M2}
GLUCOSE BLDC GLUCOMTR-MCNC: 114 MG/DL (ref 70–99)
GLUCOSE BLDC GLUCOMTR-MCNC: 123 MG/DL (ref 70–99)
GLUCOSE SERPL-MCNC: 120 MG/DL (ref 70–99)
HCT VFR BLD AUTO: 36.6 % (ref 35–47)
HGB BLD-MCNC: 13 G/DL (ref 11.7–15.7)
IMM GRANULOCYTES # BLD: 0 10E9/L (ref 0–0.4)
IMM GRANULOCYTES NFR BLD: 0.4 %
INTERPRETATION ECG - MUSE: NORMAL
INTERPRETATION ECG - MUSE: NORMAL
LYMPHOCYTES # BLD AUTO: 1.2 10E9/L (ref 0.8–5.3)
LYMPHOCYTES NFR BLD AUTO: 22.9 %
MAGNESIUM SERPL-MCNC: 1.8 MG/DL (ref 1.6–2.3)
MCH RBC QN AUTO: 31 PG (ref 26.5–33)
MCHC RBC AUTO-ENTMCNC: 35.5 G/DL (ref 31.5–36.5)
MCV RBC AUTO: 87 FL (ref 78–100)
MONOCYTES # BLD AUTO: 0.3 10E9/L (ref 0–1.3)
MONOCYTES NFR BLD AUTO: 6.3 %
MRSA DNA SPEC QL NAA+PROBE: NEGATIVE
NEUTROPHILS # BLD AUTO: 3.8 10E9/L (ref 1.6–8.3)
NEUTROPHILS NFR BLD AUTO: 70.2 %
NRBC # BLD AUTO: 0 10*3/UL
NRBC BLD AUTO-RTO: 0 /100
PLATELET # BLD AUTO: 167 10E9/L (ref 150–450)
PLATELET # BLD EST: NORMAL 10*3/UL
POTASSIUM SERPL-SCNC: 2.8 MMOL/L (ref 3.4–5.3)
POTASSIUM SERPL-SCNC: 3.3 MMOL/L (ref 3.4–5.3)
POTASSIUM SERPL-SCNC: 3.7 MMOL/L (ref 3.4–5.3)
PROT SERPL-MCNC: 6.5 G/DL (ref 6.8–8.8)
RBC # BLD AUTO: 4.19 10E12/L (ref 3.8–5.2)
RBC MORPH BLD: NORMAL
SODIUM SERPL-SCNC: 128 MMOL/L (ref 133–144)
SPECIMEN SOURCE: NORMAL
TROPONIN I SERPL-MCNC: <0.015 UG/L (ref 0–0.04)
WBC # BLD AUTO: 5.4 10E9/L (ref 4–11)

## 2019-12-12 PROCEDURE — 25000128 H RX IP 250 OP 636

## 2019-12-12 PROCEDURE — 00000146 ZZHCL STATISTIC GLUCOSE BY METER IP

## 2019-12-12 PROCEDURE — 25000128 H RX IP 250 OP 636: Performed by: INTERNAL MEDICINE

## 2019-12-12 PROCEDURE — 71046 X-RAY EXAM CHEST 2 VIEWS: CPT

## 2019-12-12 PROCEDURE — 96367 TX/PROPH/DG ADDL SEQ IV INF: CPT

## 2019-12-12 PROCEDURE — 93005 ELECTROCARDIOGRAM TRACING: CPT

## 2019-12-12 PROCEDURE — 96365 THER/PROPH/DIAG IV INF INIT: CPT

## 2019-12-12 PROCEDURE — 80053 COMPREHEN METABOLIC PANEL: CPT | Performed by: EMERGENCY MEDICINE

## 2019-12-12 PROCEDURE — 96375 TX/PRO/DX INJ NEW DRUG ADDON: CPT

## 2019-12-12 PROCEDURE — 25800025 ZZH RX 258: Performed by: INTERNAL MEDICINE

## 2019-12-12 PROCEDURE — 99285 EMERGENCY DEPT VISIT HI MDM: CPT | Mod: 25

## 2019-12-12 PROCEDURE — 85025 COMPLETE CBC W/AUTO DIFF WBC: CPT | Performed by: EMERGENCY MEDICINE

## 2019-12-12 PROCEDURE — 83735 ASSAY OF MAGNESIUM: CPT | Performed by: EMERGENCY MEDICINE

## 2019-12-12 PROCEDURE — 96368 THER/DIAG CONCURRENT INF: CPT

## 2019-12-12 PROCEDURE — 93005 ELECTROCARDIOGRAM TRACING: CPT | Mod: 76

## 2019-12-12 PROCEDURE — 25000128 H RX IP 250 OP 636: Performed by: EMERGENCY MEDICINE

## 2019-12-12 PROCEDURE — 36415 COLL VENOUS BLD VENIPUNCTURE: CPT | Performed by: INTERNAL MEDICINE

## 2019-12-12 PROCEDURE — 87641 MR-STAPH DNA AMP PROBE: CPT | Performed by: INTERNAL MEDICINE

## 2019-12-12 PROCEDURE — 99221 1ST HOSP IP/OBS SF/LOW 40: CPT | Mod: AI | Performed by: INTERNAL MEDICINE

## 2019-12-12 PROCEDURE — 99207 ZZC CDG-HISTORY COMP: MEETS EXP. PROB FOCUSED- DOWN CODED LACK OF PFSH: CPT | Performed by: INTERNAL MEDICINE

## 2019-12-12 PROCEDURE — 25800030 ZZH RX IP 258 OP 636: Performed by: EMERGENCY MEDICINE

## 2019-12-12 PROCEDURE — 25000132 ZZH RX MED GY IP 250 OP 250 PS 637: Performed by: EMERGENCY MEDICINE

## 2019-12-12 PROCEDURE — 84132 ASSAY OF SERUM POTASSIUM: CPT | Performed by: INTERNAL MEDICINE

## 2019-12-12 PROCEDURE — 12000000 ZZH R&B MED SURG/OB

## 2019-12-12 PROCEDURE — 84484 ASSAY OF TROPONIN QUANT: CPT | Performed by: EMERGENCY MEDICINE

## 2019-12-12 PROCEDURE — 25000132 ZZH RX MED GY IP 250 OP 250 PS 637: Performed by: INTERNAL MEDICINE

## 2019-12-12 PROCEDURE — 87640 STAPH A DNA AMP PROBE: CPT | Performed by: INTERNAL MEDICINE

## 2019-12-12 RX ORDER — SIMVASTATIN 20 MG
20 TABLET ORAL DAILY
Status: DISCONTINUED | OUTPATIENT
Start: 2019-12-13 | End: 2019-12-12

## 2019-12-12 RX ORDER — POTASSIUM CHLORIDE 1500 MG/1
40 TABLET, EXTENDED RELEASE ORAL ONCE
Status: COMPLETED | OUTPATIENT
Start: 2019-12-12 | End: 2019-12-12

## 2019-12-12 RX ORDER — POTASSIUM CHLORIDE 29.8 MG/ML
20 INJECTION INTRAVENOUS
Status: DISCONTINUED | OUTPATIENT
Start: 2019-12-12 | End: 2019-12-12

## 2019-12-12 RX ORDER — POTASSIUM CL/LIDO/0.9 % NACL 10MEQ/0.1L
10 INTRAVENOUS SOLUTION, PIGGYBACK (ML) INTRAVENOUS
Status: DISCONTINUED | OUTPATIENT
Start: 2019-12-12 | End: 2019-12-15 | Stop reason: HOSPADM

## 2019-12-12 RX ORDER — ACETAMINOPHEN 325 MG/1
650 TABLET ORAL EVERY 4 HOURS PRN
Status: DISCONTINUED | OUTPATIENT
Start: 2019-12-12 | End: 2019-12-15 | Stop reason: HOSPADM

## 2019-12-12 RX ORDER — ATENOLOL 25 MG/1
25 TABLET ORAL DAILY
Status: DISCONTINUED | OUTPATIENT
Start: 2019-12-12 | End: 2019-12-15 | Stop reason: HOSPADM

## 2019-12-12 RX ORDER — POTASSIUM CHLORIDE 7.45 MG/ML
10 INJECTION INTRAVENOUS
Status: DISCONTINUED | OUTPATIENT
Start: 2019-12-12 | End: 2019-12-15 | Stop reason: HOSPADM

## 2019-12-12 RX ORDER — POTASSIUM CL/LIDO/0.9 % NACL 10MEQ/0.1L
10 INTRAVENOUS SOLUTION, PIGGYBACK (ML) INTRAVENOUS ONCE
Status: COMPLETED | OUTPATIENT
Start: 2019-12-12 | End: 2019-12-12

## 2019-12-12 RX ORDER — POTASSIUM CHLORIDE 1.5 G/1.58G
20-40 POWDER, FOR SOLUTION ORAL
Status: DISCONTINUED | OUTPATIENT
Start: 2019-12-12 | End: 2019-12-15 | Stop reason: HOSPADM

## 2019-12-12 RX ORDER — ONDANSETRON 2 MG/ML
4 INJECTION INTRAMUSCULAR; INTRAVENOUS ONCE
Status: COMPLETED | OUTPATIENT
Start: 2019-12-12 | End: 2019-12-12

## 2019-12-12 RX ORDER — POTASSIUM CHLORIDE 1500 MG/1
20-40 TABLET, EXTENDED RELEASE ORAL
Status: DISCONTINUED | OUTPATIENT
Start: 2019-12-12 | End: 2019-12-15 | Stop reason: HOSPADM

## 2019-12-12 RX ORDER — SIMVASTATIN 20 MG
20 TABLET ORAL DAILY
Status: DISCONTINUED | OUTPATIENT
Start: 2019-12-12 | End: 2019-12-15 | Stop reason: HOSPADM

## 2019-12-12 RX ORDER — MAGNESIUM SULFATE 1 G/100ML
1 INJECTION INTRAVENOUS ONCE
Status: COMPLETED | OUTPATIENT
Start: 2019-12-12 | End: 2019-12-12

## 2019-12-12 RX ORDER — LEVOTHYROXINE SODIUM 112 UG/1
112 TABLET ORAL DAILY
Status: DISCONTINUED | OUTPATIENT
Start: 2019-12-13 | End: 2019-12-12

## 2019-12-12 RX ORDER — LEVOTHYROXINE SODIUM 112 UG/1
112 TABLET ORAL DAILY
Status: DISCONTINUED | OUTPATIENT
Start: 2019-12-12 | End: 2019-12-15 | Stop reason: HOSPADM

## 2019-12-12 RX ORDER — NALOXONE HYDROCHLORIDE 0.4 MG/ML
.1-.4 INJECTION, SOLUTION INTRAMUSCULAR; INTRAVENOUS; SUBCUTANEOUS
Status: DISCONTINUED | OUTPATIENT
Start: 2019-12-12 | End: 2019-12-15 | Stop reason: HOSPADM

## 2019-12-12 RX ORDER — LIDOCAINE 40 MG/G
CREAM TOPICAL
Status: DISCONTINUED | OUTPATIENT
Start: 2019-12-12 | End: 2019-12-15 | Stop reason: HOSPADM

## 2019-12-12 RX ORDER — ONDANSETRON 4 MG/1
4 TABLET, ORALLY DISINTEGRATING ORAL EVERY 6 HOURS PRN
Status: DISCONTINUED | OUTPATIENT
Start: 2019-12-12 | End: 2019-12-14

## 2019-12-12 RX ORDER — DEXTROSE MONOHYDRATE, SODIUM CHLORIDE, AND POTASSIUM CHLORIDE 50; 1.49; 9 G/1000ML; G/1000ML; G/1000ML
INJECTION, SOLUTION INTRAVENOUS CONTINUOUS
Status: DISCONTINUED | OUTPATIENT
Start: 2019-12-12 | End: 2019-12-13

## 2019-12-12 RX ORDER — LOSARTAN POTASSIUM 100 MG/1
100 TABLET ORAL DAILY
Status: DISCONTINUED | OUTPATIENT
Start: 2019-12-13 | End: 2019-12-12

## 2019-12-12 RX ORDER — LOSARTAN POTASSIUM 100 MG/1
100 TABLET ORAL DAILY
Status: DISCONTINUED | OUTPATIENT
Start: 2019-12-12 | End: 2019-12-15 | Stop reason: HOSPADM

## 2019-12-12 RX ORDER — ATENOLOL 25 MG/1
25 TABLET ORAL DAILY
Status: DISCONTINUED | OUTPATIENT
Start: 2019-12-13 | End: 2019-12-12

## 2019-12-12 RX ORDER — ONDANSETRON 2 MG/ML
4 INJECTION INTRAMUSCULAR; INTRAVENOUS EVERY 6 HOURS PRN
Status: DISCONTINUED | OUTPATIENT
Start: 2019-12-12 | End: 2019-12-14

## 2019-12-12 RX ADMIN — SODIUM CHLORIDE 500 ML: 9 INJECTION, SOLUTION INTRAVENOUS at 03:28

## 2019-12-12 RX ADMIN — LOSARTAN POTASSIUM 100 MG: 100 TABLET, FILM COATED ORAL at 12:11

## 2019-12-12 RX ADMIN — POTASSIUM CHLORIDE, DEXTROSE MONOHYDRATE AND SODIUM CHLORIDE: 150; 5; 900 INJECTION, SOLUTION INTRAVENOUS at 22:38

## 2019-12-12 RX ADMIN — ONDANSETRON 4 MG: 4 TABLET, ORALLY DISINTEGRATING ORAL at 23:40

## 2019-12-12 RX ADMIN — MAGNESIUM SULFATE HEPTAHYDRATE 1 G: 1 INJECTION, SOLUTION INTRAVENOUS at 03:47

## 2019-12-12 RX ADMIN — POTASSIUM CHLORIDE 40 MEQ: 1500 TABLET, EXTENDED RELEASE ORAL at 03:45

## 2019-12-12 RX ADMIN — ONDANSETRON 4 MG: 4 TABLET, ORALLY DISINTEGRATING ORAL at 16:52

## 2019-12-12 RX ADMIN — ATENOLOL 25 MG: 25 TABLET ORAL at 12:10

## 2019-12-12 RX ADMIN — Medication 10 MEQ: at 04:25

## 2019-12-12 RX ADMIN — POTASSIUM CHLORIDE, DEXTROSE MONOHYDRATE AND SODIUM CHLORIDE: 150; 5; 900 INJECTION, SOLUTION INTRAVENOUS at 12:11

## 2019-12-12 RX ADMIN — POTASSIUM CHLORIDE 20 MEQ: 1500 TABLET, EXTENDED RELEASE ORAL at 18:59

## 2019-12-12 RX ADMIN — POTASSIUM CHLORIDE 40 MEQ: 1500 TABLET, EXTENDED RELEASE ORAL at 17:03

## 2019-12-12 RX ADMIN — ONDANSETRON HYDROCHLORIDE 4 MG: 2 INJECTION, SOLUTION INTRAMUSCULAR; INTRAVENOUS at 02:18

## 2019-12-12 RX ADMIN — LEVOTHYROXINE SODIUM 112 MCG: 112 TABLET ORAL at 12:11

## 2019-12-12 RX ADMIN — MAGNESIUM SULFATE HEPTAHYDRATE 1 G: 1 INJECTION, SOLUTION INTRAVENOUS at 07:16

## 2019-12-12 RX ADMIN — SIMVASTATIN 20 MG: 20 TABLET, FILM COATED ORAL at 12:10

## 2019-12-12 RX ADMIN — ACETAMINOPHEN 650 MG: 325 TABLET, FILM COATED ORAL at 16:52

## 2019-12-12 RX ADMIN — AMIODARONE HYDROCHLORIDE 150 MG: 50 INJECTION, SOLUTION INTRAVENOUS at 04:13

## 2019-12-12 ASSESSMENT — MIFFLIN-ST. JEOR
SCORE: 1107
SCORE: 1109.68

## 2019-12-12 ASSESSMENT — ENCOUNTER SYMPTOMS
NAUSEA: 1
DIAPHORESIS: 1
ABDOMINAL PAIN: 0
HEADACHES: 0
CHILLS: 1
LIGHT-HEADEDNESS: 1
VOMITING: 0
COUGH: 1
DIARRHEA: 1
SEIZURES: 0

## 2019-12-12 ASSESSMENT — ACTIVITIES OF DAILY LIVING (ADL)
ADLS_ACUITY_SCORE: 13

## 2019-12-12 NOTE — ED PROVIDER NOTES
History     Chief Complaint:    Nausea    The history is provided by the patient.      Cande Glynn is a 68 year old female who presents with persistent nausea and cough.  Of note, patient was hospitalized at this facility from 12/9 through 12/10 after presenting for nausea, found to be hyponatremic (Na of 118), and hypokalemic (K2.8).  It was felt her symptoms were secondary to hydrochlorothiazide.  Her sodium improved to 126 at the time of discharge following IV fluids.  BP medications were changed to losartan.  Since discharge from the hospital, the patient notes persistent nausea, which has now worsened further than that which she experienced prior to her initial presentation.  She denies associated vomiting.  She has not been experiencing multiple episodes of diarrhea, without associated blood.  She denies abdominal pain.  She additionally denies chest pain, chest pressure, no shortness of breath.  No headaches.  No seizure activity reported.    Allergies:  No Known Drug Allergies     Medications:    Tenormin  Levothroid  Cozaar  Zofran-ODT  Zocor    Past Medical History:    Hypertension  Thyroid disease  Hyponatremia    Past Surgical History:    The patient does not have any pertinent past surgical history.    Family History:    No past pertinent family history.    Social History:  Negative for tobacco use.  Negative for alcohol use.  Negative for drug use.  Patient accompanied to the ER by her .  Marital Status:   [2]     Review of Systems   Constitutional: Positive for chills and diaphoresis.   Respiratory: Positive for cough.    Cardiovascular: Negative for chest pain.        Denies chest pressure   Gastrointestinal: Positive for diarrhea and nausea. Negative for abdominal pain and vomiting.   Neurological: Positive for light-headedness. Negative for seizures and headaches.   All other systems reviewed and are negative.      Physical Exam     Patient Vitals for the past 24 hrs:   BP Temp  "Temp src Pulse Heart Rate Resp SpO2 Height Weight   12/12/19 0455 139/79 -- -- 80 82 -- 98 % -- --   12/12/19 0450 (!) 145/73 -- -- 79 105 -- 93 % -- --   12/12/19 0445 (!) 143/62 -- -- 101 90 -- 94 % -- --   12/12/19 0441 -- -- -- -- 99 -- 95 % -- --   12/12/19 0440 -- -- -- -- 69 -- 90 % -- --   12/12/19 0435 (!) 143/62 -- -- 88 89 -- 96 % -- --   12/12/19 0430 134/73 -- -- 89 105 -- 95 % -- --   12/12/19 0415 (!) 153/82 -- -- 97 101 -- 94 % -- --   12/12/19 0400 (!) 147/72 -- -- 79 77 -- 95 % -- --   12/12/19 0345 (!) 154/73 -- -- 54 55 -- 92 % -- --   12/12/19 0300 (!) 143/66 -- -- 52 -- -- -- -- --   12/12/19 0215 (!) 170/79 -- -- 56 -- -- -- -- --   12/12/19 0213 -- -- -- -- -- -- 94 % -- --   12/12/19 0200 (!) 153/71 -- -- 56 -- -- -- -- --   12/12/19 0124 (!) 150/81 96.7  F (35.9  C) Temporal -- 62 16 95 % 1.626 m (5' 4\") 59 kg (130 lb)     Physical Exam    General:   Well-nourished   Speaking in full sentences   Appears uncomfortable resting on gurney  Eyes:   Conjunctiva without injection or scleral icterus  ENT:   Moist mucous membranes   Nares patent   Pinnae normal  Neck:   Full ROM   No stiffness appreciated  Resp:   Lungs CTAB   No crackles, wheezing or audible rubs   Good air movement  CV:    Normal rate, regular rhythm   S1 and S2 present   No murmur, gallop or rub  GI:   BS present   Abdomen soft without distention   Non-tender to light and deep palpation   No guarding or rebound tenderness  Skin:   Warm, dry, well perfused   No rashes or open wounds on exposed skin  MSK:   Moves all extremities   No focal deformities or swelling  Neuro:   Alert   Answers questions appropriately   Moves all extremities equally  Psych:   Normal affect, normal mood    Emergency Department Course     ECG (2:48:40):  Rate 55 bpm. NV interval 148. QRS duration 144. QT/QTc 508/485. P-R-T axes 68 4 100. Sinus bradycardia. Left bundle branch block. Abnormal ECG. Interpreted at 0259 by Marques Kaur MD.    ECG " (4:11:35):  Rate 124 bpm. ND interval 142. QRS duration 142. QT/QTc 346/497. P-R-T axes 87 3 171. Sinus tachycardia with frequent and consecutive premature ventricular complexes. Left bundle branch block. Abnormal ECG. Interpreted at 0415 by Marques Kaur MD.    ECG (4:34:37):  Rate 78 bpm. ND interval 154. QRS duration 138. QT/QTc 450/513. P-R-T axes 65 11 113. Sinus rhythm with premature complexes in a pattern of bigeminy. Left bundle branch block. Abnormal ECG. Interpreted at 0420 by Marques Kaur MD.    Imaging:  Radiology findings were communicated with the patient and family who voiced understanding of the findings.    XR  Chest, PA & LAT:   No acute abnormality, as per radiology.     Laboratory:  Laboratory findings were communicated with the patient and family who voiced understanding of the findings.    CBC: AWNL (WBC 5.4, HGB 13.0, )  CMP:  L, potassium 2.8 L, glucose 120 H, BUN 5 L, calcium 7.9 L, protein total 6.5 L o/w WNL (Creatinine 0.64)  Troponin I (0245): <0.015  Magnesium: 1.8    Interventions:  0218: Zofran 4 mg IV  0328: 0.9% sodium chloride BOLUS 500 mL IV x 2  0345: K-dur 40 mEq PO  0347: magnesium sulfate 1 gm in 100 mL D5W intermittent infusion x 2  0413: amiodarone 150 mg IV  0425: potassium chloride 10 mEq in 100 mL intermittent infusion with 10 mg lidocaine    Emergency Department Course:  Past medical records, nursing notes, and vitals reviewed.    0204: I performed an exam of the patient as documented above.     EKG obtained in the ED, see results above.     IV was inserted and blood was drawn for laboratory testing, results above.    The patient was sent for a chest x-ray while in the emergency department, results above.     0412: I rechecked the patient and discussed the results of her workup thus far.     0433: patient rechecked and updated.    0459: I spoke with [facility] about the patient's previous EKG and cardiac workup records.  They will fax over  previous Holter records    0549: I spoke with Dr. Oropeza from [facility] cardiology regarding the patient.    I personally reviewed the laboratory, EKG, and imaging results with the Patient and spouse and answered all related questions prior to admission.     Findings and plan explained to the Patient and spouse who consents to admission.     0541: Discussed the patient with Dr. Tinoco, who will admit the patient to a cardiac telemetry bed for further monitoring, evaluation, and treatment.     Impression & Plan     Medical Decision Making:  Cande Glynn is a very pleasant 68 yr old F presenting to the ER for evaluation of nausea.  VS on presentation reveal elevated BP, and mild bradycardia, though are otherwise unremarkable.  Previous records and hospital notes reviewed.  By history, given associated nausea and now diarrhea, concern for evolving infectious enteritis.  This has resulted in subsequent electrolyte derangements including K of 2.8, as well as mild hyponatremia (improving).  During her ED course, she was found to have sinus rhythm with LBBB (later confirmed to be old based on report from Holter monitor obtained at [facility] in 2016), though on cardiac monitor transitioned into 3-4 beat runs of wide complex tachycardia.  Given above history and initial laboratory studies, electrolyte repletion begun including Mg (total of 2g, also noted given long QT interval), as well as K (40 mEq PO and 10 mEq IV).  Given concern for cardiac instability and further progression to sustained VT, pt also provided 150 mg of IV amiodarone.  Repeat EKG demonstrates what appear to be more frequent PVCs, though on review of case with cardiology, they felt patient would be high risk for progression to VT given history and current ED course.  Pt denies chest pain throughout her course, and troponin is negative.  No HA, seizure activity, nor focal neurologic deficits to suggest CNS cause of nausea.  Pt provided 1L NS during ED course.   Given persistent nausea, as well as evidence of cardiac irritability in the setting of electrolyte derangement, will plan on admission for close monitoring and correction to telemetry unit.  During ED course, her rhythm did improve and remained consistently sinus rhythm without frequent PVC nor VT.  Pt updated regarding results of above studies and her questions were answered prior to admission.        Diagnosis:    ICD-10-CM   1. Nausea R11.0   2. LBBB (left bundle branch block) I44.7   3. Hypokalemia E87.6   4. Hyponatremia E87.1   5. Prolonged Q-T interval on ECG R94.31     Disposition:  Admitted to a cardiac telemetry bed.    Scribe Disclosure:  Bridget BELTRÁN, am serving as a scribe at 2:27 AM on 12/12/2019 to document services personally performed by Marques Kaur MD based on my observations and the provider's statements to me.     Bridget Carlisle  12/12/2019   [facility] EMERGENCY DEPARTMENT       Marques Kaur MD  12/12/19 0900

## 2019-12-12 NOTE — ED TRIAGE NOTES
Pt presents for persistent nausea and cough, with diarrhea starting today Pt discharged from hospital yesterday, dx with dehydration. Pt hasn't had any bouts of emesis, just dry heaves.

## 2019-12-12 NOTE — ED NOTES
Patient placed on bedpan. Pt. Taken off bedpan, perineal care complete. Pt complained of being cold, warm blanket brought to pt.

## 2019-12-12 NOTE — H&P
[facility]    History and Physical - Hospitalist Service       Date of Admission:  12/12/2019    Assessment & Plan   Cande Glynn is a 68 year old female admitted on 12/12/2019. She has failed outpatient management and presents with persisting poor oral intake with nausea, vomiting and diarrhea likely related to gastroenteritis.  This is also resulting in electrolyte abnormalities with hyponatremia and hypokalemia.  She is also having frequent PVCs.  She has history hypertension, dyslipidemia and hypothyroidism.    1.  Gastroenteritis:  --It seems most likely she has a viral gastroenteritis causing her symptoms.  --Her symptoms been persisting for 6days resulting in minimal oral intake and significant electrolyte abnormalities.  --Treat symptomatically with IV fluids and antiemetics.  --Check stool studies if she has more diarrhea.  --She is afebrile and not having any abdominal pain.    2.  Left bundle branch block and frequent PVCs:  --We do not have previous EKGs for comparison but her left bundle branch block is likely chronic.  She was having frequent PVCs upon arrival.  --She did have a previous Holter monitoring noted in care everywhere but we are unable to obtain the results.  --It was felt she was at high risk for sustained ventricular tachycardia and she was given a dose of amiodarone while her potassium was replaced in the emergency department.  --PVCs now seem to have resolved after receiving some potassium.  Magnesium was okay at 1.8.  --ED physician discussed with cardiology who agreed with plan electrolyte replacement.  --Monitor on telemetry.  Will not continue amiodarone unless arrhythmias resume.    3.  Hypokalemia:  --Hydrochlorothiazide was discontinued on her previous hospitalization  --Potassium replacement and monitor.    4.  Hyponatremia:  --Based on from previous labs she may have an element of mild chronic hyponatremia.  At that time she was also on  hydrochlorothiazide.  --Sodium is 118 on her previous hospitalization is now 128.  --Continue to monitor.    5.  Hypothyroidism.   -Continue home levothyroxine    6.  Hypertension:  --Resume home dose of atenolol and Cozaar    7.  Dyslipidemia:  --Resume home dose of Zocor       Diet: Edler  DVT Prophylaxis: Pneumatic Compression Devices  Carr Catheter: not present  Code Status: Full    Disposition Plan   Expected discharge: 2 - 3 days, recommended to prior living arrangement once Gastroenteritis symptoms improved so that she is able to tolerate oral intake and maintain her serum electrolytes.  Entered: Derek Tinoco MD 12/12/2019, 7:12 AM     The patient's care was discussed with the ED physician.    Derek Tinoco MD  [facility]    ______________________________________________________________________    Chief Complaint   Nausea, vomiting and diarrhea    History is obtained from the patient    History of Present Illness   Cande Glynn is a 68 year old female who was initially admitted here from 12/9 to 12/10 for nausea, poor oral intake, hyponatremia and hypokalemia.  She was treated with IV fluids and potassium replaced and she was discharged home.  Unfortunately her nausea continued to worsen and she developed vomiting and also developed diarrhea.  In the emergency department she was noted to have left bundle branch block with frequent PVCs and a potassium of 2.8.    Review of Systems    A complete review of system was performed was negative except for the pertinent positives which can be seen in history of present illness    Past Medical History    I have reviewed this patient's medical history and updated it with pertinent information if needed.   Past Medical History:   Diagnosis Date     Hypertension      Thyroid disease        Past Surgical History   I have reviewed this patient's surgical history and updated it with pertinent information if needed.  No past surgical  history on file.    Social History   I have reviewed this patient's social history and updated it with pertinent information if needed.  Social History     Tobacco Use     Smoking status: Not on file   Substance Use Topics     Alcohol use: Not on file     Drug use: Not on file       Family History   I have reviewed this patient's family history and updated it with pertinent information if needed.   Reviewed and noncontributory    Prior to Admission Medications   Prior to Admission Medications   Prescriptions Last Dose Informant Patient Reported? Taking?   Levothyroxine Sodium (LEVOTHROID PO)   Yes No   Sig: Take 112 mcg by mouth daily    atenolol (TENORMIN) 25 MG tablet   Yes No   Sig: Take 25 mg by mouth daily   losartan (COZAAR) 100 MG tablet   No No   Sig: Take 1 tablet (100 mg) by mouth daily   ondansetron (ZOFRAN-ODT) 4 MG ODT tab   No No   Sig: Take 1 tablet (4 mg) by mouth every 6 hours as needed for nausea or vomiting   simvastatin (ZOCOR) 20 MG tablet   Yes No   Sig: Take 20 mg by mouth daily      Facility-Administered Medications: None     Allergies   No Known Allergies    Physical Exam   Vital Signs: Temp: 96.7  F (35.9  C) Temp src: Temporal BP: (!) 162/75 Pulse: 60 Heart Rate: 63 Resp: 16 SpO2: 97 % O2 Device: Nasal cannula Oxygen Delivery: 2 LPM  Weight: 130 lbs 0 oz      Vital signs reviewed  General:  Alert, calm, NAD  CV: regular rate and rhythm, no murmurs or rubs  Lungs:  Clear to ascultation bilaterally, normal respiratory effort  HEENT:  Pupil round, equal, conjuctivae, sclerae and lids normal, neck is supple  Abdomen:  Soft, nontender, nondistended, no masses, normal bowel sounds  Extremities:  No edema  Neuro: normal strength and sensation in all 4 extremities, cranial nerves grossly intact  Psychiatric:  Mood and affect within normal limits      Data   Data reviewed today: I reviewed all medications, new labs and imaging results over the last 24 hours. I personally reviewed the EKG tracing  showing Left bundle branch block with frequent PVCs.    Most Recent 3 CBC's:  Recent Labs   Lab Test 12/12/19  0219 12/09/19  1612 10/19/16  1503   WBC 5.4 3.2* 9.0   HGB 13.0 15.0 15.4   MCV 87 85 84    200 277     Most Recent 3 BMP's:  Recent Labs   Lab Test 12/12/19  0219 12/10/19  1050 12/10/19  0512  12/09/19  2147 12/09/19  1612   * 126* 127*   < > 124* 118*   POTASSIUM 2.8*  --  3.4  --  3.2* 2.8*   CHLORIDE 94  --  95  --   --  81*   CO2 26  --  25  --   --  27   BUN 5*  --  7  --   --  10   CR 0.64  --  0.63  --   --  0.69   ANIONGAP 8  --  7  --   --  10   ZORAIDA 7.9*  --  7.7*  --   --  8.4*   *  --  89  --   --  110*    < > = values in this interval not displayed.     Recent Results (from the past 24 hour(s))   Chest XR,  PA & LAT    Narrative    EXAM: XR CHEST 2 VW  LOCATION: [facility]  DATE/TIME: 12/12/2019 3:07 AM    INDICATION: Cough.  COMPARISON: 12/09/2019.    FINDINGS: The heart size is normal. The lungs are mildly hyperinflated but clear. No pneumothorax or pleural effusion. Degenerative disease and prominent kyphosis in the thoracic spine.      Impression    IMPRESSION: No acute abnormality.

## 2019-12-12 NOTE — ED NOTES
[facility]  ED Nurse Handoff Report    Cande Glynn is a 68 year old female   ED Chief complaint: Nausea  . ED Diagnosis:   Final diagnoses:   Nausea   LBBB (left bundle branch block)   Hypokalemia   Hyponatremia   Prolonged Q-T interval on ECG     Allergies: No Known Allergies    Code Status: Full Code  Activity level - Baseline/Home:  Independent. Activity Level - Current:   Stand by Assist. Lift room needed: No. Bariatric: No   Needed: No   Isolation: No. Infection: Not Applicable.     Vital Signs:   Vitals:    12/12/19 0500 12/12/19 0510 12/12/19 0520 12/12/19 0530   BP: (!) 149/85 (!) 154/70 (!) 169/84 (!) 159/85   Pulse: 96 71 70 70   Resp:       Temp:       TempSrc:       SpO2: 97% 96% 99% 96%   Weight:       Height:           Cardiac Rhythm:  ,      Pain level:    Patient confused: No. Patient Falls Risk: Yes.   Elimination Status: Has voided   Patient Report - Initial Complaint: pt presents with nausea, cough and diarrhea that started yesterday.  Pt was discharged recently for dehydration.   Tests Performed:   Abnormal Labs Reviewed   COMPREHENSIVE METABOLIC PANEL - Abnormal; Notable for the following components:       Result Value    Sodium 128 (*)     Potassium 2.8 (*)     Glucose 120 (*)     Urea Nitrogen 5 (*)     Calcium 7.9 (*)     Protein Total 6.5 (*)     All other components within normal limits   . Abnormal Results: see above.   Treatments provided: meds, IVF  Family Comments:  left for home  OBS brochure/video discussed/provided to patient:  N/A  ED Medications:   Medications   ondansetron (ZOFRAN) injection 4 mg (4 mg Intravenous Given 12/12/19 0218)   0.9% sodium chloride BOLUS (0 mLs Intravenous Stopped 12/12/19 0528)   magnesium sulfate 1 gm in 100mL D5W intermittent infusion ( Intravenous Restarted 12/12/19 0556)   potassium chloride ER (K-DUR/KLOR-CON M) CR tablet 40 mEq (40 mEq Oral Given 12/12/19 0345)   potassium chloride 10 mEq in 100 mL intermittent  infusion with 10 mg lidocaine (0 mEq Intravenous Stopped 12/12/19 7959)   amiodarone (NEXTERONE) 50 MG/ML injection (  Stopped 12/12/19 6024)     Drips infusing:  Yes  For the majority of the shift, the patient's behavior Green..     Severe Sepsis OR Septic Shock Diagnosis Present: No    Shortly after starting Magnesium drip, pt developed abnormal heart rhythm. (see results)   Mag stopped and potassium replacement started per MD.  EKG done.  Pt denied chest pain and SOB    ED Nurse Name/Phone Number: Manuel Agustin RN,   6:02 AM

## 2019-12-12 NOTE — PROGRESS NOTES
Discussed patient directly with Dr. Tinoco. Reviewed cares. Visited with patient this evening and continuing to have nausea and a HA. Has not taken anything yet. Plan to try Zofran and acetaminophen. Will continue cares in AM.

## 2019-12-12 NOTE — PHARMACY-ADMISSION MEDICATION HISTORY
Admission medication history interview status for this patient is complete. See [facility] admission navigator for allergy information, prior to admission medications and immunization status.     Medication history interview source(s):Patient  Medication history resources (including written lists, pill bottles, clinic record):None  Primary pharmacy:mail order [facility]    Changes made to PTA medication list:  Added: none  Deleted: none  Changed: none    Actions taken by pharmacist (provider contacted, etc):None     Additional medication history information:None    Medication reconciliation/reorder completed by provider prior to medication history?  No       Prior to Admission medications    Medication Sig Last Dose Taking? Auth Provider   atenolol (TENORMIN) 25 MG tablet Take 25 mg by mouth daily 12/12/2019 at Unknown time Yes Unknown, Entered By History   Levothyroxine Sodium (LEVOTHROID PO) Take 112 mcg by mouth daily  12/12/2019 at Unknown time Yes Reported, Patient   losartan (COZAAR) 100 MG tablet Take 1 tablet (100 mg) by mouth daily 12/12/2019 at Unknown time Yes Ana Maria Malloy PA-C   ondansetron (ZOFRAN-ODT) 4 MG ODT tab Take 1 tablet (4 mg) by mouth every 6 hours as needed for nausea or vomiting  Yes Ana Maria Malloy PA-C   simvastatin (ZOCOR) 20 MG tablet Take 20 mg by mouth daily 12/12/2019 at Unknown time Yes Unknown, Entered By History

## 2019-12-13 LAB
ANION GAP SERPL CALCULATED.3IONS-SCNC: 7 MMOL/L (ref 3–14)
BUN SERPL-MCNC: 2 MG/DL (ref 7–30)
CALCIUM SERPL-MCNC: 7.4 MG/DL (ref 8.5–10.1)
CHLORIDE SERPL-SCNC: 97 MMOL/L (ref 94–109)
CO2 SERPL-SCNC: 22 MMOL/L (ref 20–32)
CREAT SERPL-MCNC: 0.62 MG/DL (ref 0.52–1.04)
GFR SERPL CREATININE-BSD FRML MDRD: >90 ML/MIN/{1.73_M2}
GLUCOSE SERPL-MCNC: 130 MG/DL (ref 70–99)
PHOSPHATE SERPL-MCNC: 1.2 MG/DL (ref 2.5–4.5)
PHOSPHATE SERPL-MCNC: 2.2 MG/DL (ref 2.5–4.5)
POTASSIUM SERPL-SCNC: 3.9 MMOL/L (ref 3.4–5.3)
SODIUM SERPL-SCNC: 126 MMOL/L (ref 133–144)

## 2019-12-13 PROCEDURE — 80048 BASIC METABOLIC PNL TOTAL CA: CPT | Performed by: INTERNAL MEDICINE

## 2019-12-13 PROCEDURE — 84100 ASSAY OF PHOSPHORUS: CPT | Performed by: INTERNAL MEDICINE

## 2019-12-13 PROCEDURE — 25000132 ZZH RX MED GY IP 250 OP 250 PS 637: Performed by: INTERNAL MEDICINE

## 2019-12-13 PROCEDURE — 12000000 ZZH R&B MED SURG/OB

## 2019-12-13 PROCEDURE — 25000128 H RX IP 250 OP 636: Performed by: INTERNAL MEDICINE

## 2019-12-13 PROCEDURE — 25800030 ZZH RX IP 258 OP 636: Performed by: INTERNAL MEDICINE

## 2019-12-13 PROCEDURE — 25000125 ZZHC RX 250: Performed by: INTERNAL MEDICINE

## 2019-12-13 PROCEDURE — 99232 SBSQ HOSP IP/OBS MODERATE 35: CPT | Performed by: INTERNAL MEDICINE

## 2019-12-13 PROCEDURE — 36415 COLL VENOUS BLD VENIPUNCTURE: CPT | Performed by: INTERNAL MEDICINE

## 2019-12-13 PROCEDURE — 40000275 ZZH STATISTIC RCP TIME EA 10 MIN

## 2019-12-13 PROCEDURE — 25800025 ZZH RX 258: Performed by: INTERNAL MEDICINE

## 2019-12-13 PROCEDURE — 93005 ELECTROCARDIOGRAM TRACING: CPT

## 2019-12-13 RX ORDER — PROCHLORPERAZINE MALEATE 5 MG
5 TABLET ORAL EVERY 6 HOURS PRN
Status: DISCONTINUED | OUTPATIENT
Start: 2019-12-13 | End: 2019-12-14

## 2019-12-13 RX ORDER — PROCHLORPERAZINE 25 MG
12.5 SUPPOSITORY, RECTAL RECTAL EVERY 12 HOURS PRN
Status: DISCONTINUED | OUTPATIENT
Start: 2019-12-13 | End: 2019-12-14

## 2019-12-13 RX ADMIN — PROCHLORPERAZINE EDISYLATE 5 MG: 5 INJECTION, SOLUTION INTRAMUSCULAR; INTRAVENOUS at 19:37

## 2019-12-13 RX ADMIN — POTASSIUM CHLORIDE, DEXTROSE MONOHYDRATE AND SODIUM CHLORIDE: 150; 5; 900 INJECTION, SOLUTION INTRAVENOUS at 08:56

## 2019-12-13 RX ADMIN — LOSARTAN POTASSIUM 100 MG: 100 TABLET, FILM COATED ORAL at 08:48

## 2019-12-13 RX ADMIN — SIMVASTATIN 20 MG: 20 TABLET, FILM COATED ORAL at 08:48

## 2019-12-13 RX ADMIN — POTASSIUM CHLORIDE 20 MEQ: 1.5 POWDER, FOR SOLUTION ORAL at 14:19

## 2019-12-13 RX ADMIN — SODIUM PHOSPHATE, MONOBASIC, MONOHYDRATE 20 MMOL: 276; 142 INJECTION, SOLUTION INTRAVENOUS at 14:13

## 2019-12-13 RX ADMIN — LEVOTHYROXINE SODIUM 112 MCG: 112 TABLET ORAL at 08:48

## 2019-12-13 RX ADMIN — ONDANSETRON 4 MG: 4 TABLET, ORALLY DISINTEGRATING ORAL at 14:47

## 2019-12-13 RX ADMIN — ATENOLOL 25 MG: 25 TABLET ORAL at 08:49

## 2019-12-13 ASSESSMENT — ACTIVITIES OF DAILY LIVING (ADL)
ADLS_ACUITY_SCORE: 13

## 2019-12-13 NOTE — PLAN OF CARE
Full code.  ICU transfer this shift.  VSS on room air. Has been hypertensive. Afebrile.  Needs stool sample. No BM today.  Up with stand by assist to independent.  Continue D5 NS w/ 20K @ 100.  Tele: SR. Monitor for PVCs.  K replaced to 3.7  Alert and oriented.

## 2019-12-13 NOTE — PROGRESS NOTES
"An EKG was completed on the pt, with no complications noted during the procedure.    Vital signs:  Temp: 96  F (35.6  C) Temp src: Oral BP: (!) 166/60 Pulse: 52 Heart Rate: 62 Resp: 16 SpO2: 94 % O2 Device: None (Room air) Oxygen Delivery: 2 LPM Height: 162.6 cm (5' 4\") Weight: 58.7 kg (129 lb 6.6 oz)  Estimated body mass index is 22.21 kg/m  as calculated from the following:    Height as of this encounter: 1.626 m (5' 4\").    Weight as of this encounter: 58.7 kg (129 lb 6.6 oz).      Past Medical History:   Diagnosis Date     Hypertension      Thyroid disease        No past surgical history on file.    No family history on file.    Social History     Tobacco Use     Smoking status: Not on file   Substance Use Topics     Alcohol use: Not on file     James Rossi, RT  12/13/2019    "

## 2019-12-13 NOTE — PLAN OF CARE
VSS, RA, still bradycardic w/ L BBB so EKG completed, denies pain, intermittent nausea but declines zofran, no stools today, pt just not feeling very well overall today, encouraging fluids & to eat.  Hopefully discharge tomorrow.  Handoff given to Ekta DUNN RN.  Will continue to monitor.

## 2019-12-13 NOTE — PROGRESS NOTES
[facility]  Hospitalist Progress Note      Assessment & Plan   Cande Glynn is a 69 year old female with a past medical history of hypertension and thyroid disease who was admitted on 12/12/2019 with gastroenteritis and electrolyte abnormalities.  EKG initially had shown frequent PVCs and a new left bundle branch block.  However, aside of the left annoying block was old in nature.  She was given amiodarone once in the ER.  She was monitored on telemetry.  PVCs resolved as electrolytes were replaced case had been discussed with cardiology.  On admission patient had had frequent diarrhea during hospitalization symptom had resolved.  Was unable to collect stool sample.  She was supported symptomatically with IV fluids and antiemetics.    Viral gastroenteritis  - Continues to have some nausea without vomiting.  Continue Zofran for symptomatic support.  -Discontinue IV fluids and encourage oral hydration  -Collect stool studies if able but has not had a bowel movement in greater than 24 hours    Left bundle branch block with frequent PVCs  -Reviewed telemetry monitoring-patient is bradycardic with a prolonged QTC  -PVCs resolved with electrolyte replacement  -Check EKG    Hyponatremia  - Likely has some mild chronic hyponatremia.  Of note when her sodium was 120 and she is on hydrochlorothiazide which is since been discontinued.  - Sodium is her return near her baseline anticipate this will continue to improve as D5 fluids were discontinued  -Continue to monitor    Hypokalemia  -Resolved    Hypothyroidism  -Levothyroxine continue    Hypertension  -Atenolol and Cozaar continued    Dyslipidemia  -Home Zocor continued    FEN: Oral hydration; Discontinue IV fluids; Monitor; Regular  Activity: As tolerated  DVT Prophylaxis: Pneumatic Compression Devices  Code Status: Full Code  Expected discharge: Tomorrow, recommended to prior living arrangement once tolerating diet.    Mamie Dietrich, DO  Text Page (7am -  6pm, M-F)    Interval History   She continues to just not feel well.  She has no specific symptoms.  Headaches have resolved with Tylenol.  She is some nausea without vomiting.  No abdominal pain and diarrhea has resolved.  No chest pain, palpitations, shortness of breath, or cough. Encouraged patient to walk the halls and sit in chair with meals to help improve mood. Reports just not feeling well today. Discussed with nursing no family was present.  Reviewed telemetry.    -Data reviewed today: I reviewed all new labs and imaging results over the last 24 hours.    Physical Exam   Temp: 96  F (35.6  C) Temp src: Oral BP: (!) 166/60 Pulse: 52 Heart Rate: 62 Resp: 16 SpO2: 94 % O2 Device: None (Room air)    Vitals:    12/12/19 0124 12/12/19 1023   Weight: 59 kg (130 lb) 58.7 kg (129 lb 6.6 oz)     Vital Signs with Ranges  Temp:  [96  F (35.6  C)-97.8  F (36.6  C)] 96  F (35.6  C)  Pulse:  [52-61] 52  Heart Rate:  [54-62] 62  Resp:  [7-16] 16  BP: (161-174)/(60-83) 166/60  SpO2:  [92 %-97 %] 94 %  I/O last 3 completed shifts:  In: 1854 [P.O.:300; I.V.:1554]  Out: 250 [Urine:250]    Constitutional: Awake, alert, cooperative, no apparent distress. Appears ill. Appears stated age.   HEENT: Atraumatic. Normocephalic. Conjunctiva non-injected. Sclera anicteric. MMM.   Respiratory: Moves air bilaterally. Clear to auscultation bilaterally, no crackles or wheezing  Cardiovascular: Bradycardia and regular rhythm, normal S1 and S2, and no murmur noted  GI: Normal bowel sounds, soft, non-distended, non-tender  Skin/Integumen: No rashes, no cyanosis, no edema    Medications       atenolol  25 mg Oral Daily     levothyroxine  112 mcg Oral Daily     losartan  100 mg Oral Daily     simvastatin  20 mg Oral Daily     sodium chloride (PF)  3 mL Intracatheter Q8H     Data   Recent Labs   Lab 12/13/19  0749 12/12/19  2056 12/12/19  1620 12/12/19  0219 12/10/19  1050 12/10/19  0512  12/09/19  1612   WBC  --   --   --  5.4  --   --   --   3.2*   HGB  --   --   --  13.0  --   --   --  15.0   MCV  --   --   --  87  --   --   --  85   PLT  --   --   --  167  --   --   --  200   *  --   --  128* 126* 127*   < > 118*   POTASSIUM 3.9 3.7 3.3* 2.8*  --  3.4   < > 2.8*   CHLORIDE 97  --   --  94  --  95  --  81*   CO2 22  --   --  26  --  25  --  27   BUN 2*  --   --  5*  --  7  --  10   CR 0.62  --   --  0.64  --  0.63  --  0.69   ANIONGAP 7  --   --  8  --  7  --  10   ZORAIDA 7.4*  --   --  7.9*  --  7.7*  --  8.4*   *  --   --  120*  --  89  --  110*   ALBUMIN  --   --   --  3.4  --   --   --  4.0   PROTTOTAL  --   --   --  6.5*  --   --   --  7.5   BILITOTAL  --   --   --  0.3  --   --   --  0.6   ALKPHOS  --   --   --  78  --   --   --  88   ALT  --   --   --  19  --   --   --  19   AST  --   --   --  23  --   --   --  28   LIPASE  --   --   --   --   --   --   --  89   TROPI  --   --   --  <0.015  --   --   --   --     < > = values in this interval not displayed.     No results found for this or any previous visit (from the past 24 hour(s)).

## 2019-12-13 NOTE — PROGRESS NOTES
ICU End of Shift Summary.  For vital signs and complete assessments, please see documentation flowsheets.     Pertinent assessments: pt A&Ox4, HR sinus wendy 48 asymptomatic, BP elevated - morning medications given, RA - nonproductive cough, LS clear/dim, Good urine output, no BM. Walk with SBA to bathroom. C/O 6/10 HA - Tylenol given with relief. C/O nausea - Zofran given with mild relief. Son at bedside briefly, updated and questions answered, will continue to monitor.   Major Shift Events: N\A  Plan (Upcoming Events): monitor and treat electrolytes imbalance, zofran for nausea, tylenol for HA  Discharge/Transfer Needs: plan to discharge home with .     Bedside Shift Report Completed : yes  Bedside Safety Check Completed: yes

## 2019-12-13 NOTE — PLAN OF CARE
Pt remains hospitalized for hypokalemia. Elevated BP's, on RA, denies any pain, c/o nausea at start of shift Zofran x 1 and aromatherapy given. Up SBA/Independently. New PIV, /hr, on tele monitoring. Stool sample needed.

## 2019-12-14 LAB
ALBUMIN UR-MCNC: 20 MG/DL
ANION GAP SERPL CALCULATED.3IONS-SCNC: 8 MMOL/L (ref 3–14)
APPEARANCE UR: ABNORMAL
BACTERIA #/AREA URNS HPF: ABNORMAL /HPF
BILIRUB UR QL STRIP: NEGATIVE
BUN SERPL-MCNC: 6 MG/DL (ref 7–30)
CALCIUM SERPL-MCNC: 7.9 MG/DL (ref 8.5–10.1)
CHLORIDE SERPL-SCNC: 90 MMOL/L (ref 94–109)
CO2 SERPL-SCNC: 24 MMOL/L (ref 20–32)
COLOR UR AUTO: YELLOW
CREAT SERPL-MCNC: 0.67 MG/DL (ref 0.52–1.04)
GFR SERPL CREATININE-BSD FRML MDRD: >90 ML/MIN/{1.73_M2}
GLUCOSE SERPL-MCNC: 101 MG/DL (ref 70–99)
GLUCOSE UR STRIP-MCNC: NEGATIVE MG/DL
HGB UR QL STRIP: NEGATIVE
KETONES UR STRIP-MCNC: 10 MG/DL
LEUKOCYTE ESTERASE UR QL STRIP: ABNORMAL
MAGNESIUM SERPL-MCNC: 1.8 MG/DL (ref 1.6–2.3)
MUCOUS THREADS #/AREA URNS LPF: PRESENT /LPF
NITRATE UR QL: NEGATIVE
OSMOLALITY UR: 532 MMOL/KG (ref 100–1200)
PH UR STRIP: 6 PH (ref 5–7)
PHOSPHATE SERPL-MCNC: 2.3 MG/DL (ref 2.5–4.5)
POTASSIUM SERPL-SCNC: 3.9 MMOL/L (ref 3.4–5.3)
RBC #/AREA URNS AUTO: 2 /HPF (ref 0–2)
SODIUM SERPL-SCNC: 122 MMOL/L (ref 133–144)
SODIUM SERPL-SCNC: 122 MMOL/L (ref 133–144)
SODIUM UR-SCNC: 105 MMOL/L
SOURCE: ABNORMAL
SP GR UR STRIP: 1.02 (ref 1–1.03)
SQUAMOUS #/AREA URNS AUTO: 1 /HPF (ref 0–1)
T4 FREE SERPL-MCNC: 1.41 NG/DL (ref 0.76–1.46)
TSH SERPL DL<=0.005 MIU/L-ACNC: 6.48 MU/L (ref 0.4–4)
UROBILINOGEN UR STRIP-MCNC: 2 MG/DL (ref 0–2)
WBC #/AREA URNS AUTO: 11 /HPF (ref 0–5)

## 2019-12-14 PROCEDURE — 84100 ASSAY OF PHOSPHORUS: CPT | Performed by: INTERNAL MEDICINE

## 2019-12-14 PROCEDURE — 25000128 H RX IP 250 OP 636: Performed by: INTERNAL MEDICINE

## 2019-12-14 PROCEDURE — 99232 SBSQ HOSP IP/OBS MODERATE 35: CPT | Performed by: INTERNAL MEDICINE

## 2019-12-14 PROCEDURE — 93005 ELECTROCARDIOGRAM TRACING: CPT

## 2019-12-14 PROCEDURE — 12000000 ZZH R&B MED SURG/OB

## 2019-12-14 PROCEDURE — 40000275 ZZH STATISTIC RCP TIME EA 10 MIN

## 2019-12-14 PROCEDURE — 84300 ASSAY OF URINE SODIUM: CPT | Performed by: INTERNAL MEDICINE

## 2019-12-14 PROCEDURE — 36415 COLL VENOUS BLD VENIPUNCTURE: CPT | Performed by: INTERNAL MEDICINE

## 2019-12-14 PROCEDURE — 25000132 ZZH RX MED GY IP 250 OP 250 PS 637: Performed by: INTERNAL MEDICINE

## 2019-12-14 PROCEDURE — 84439 ASSAY OF FREE THYROXINE: CPT | Performed by: INTERNAL MEDICINE

## 2019-12-14 PROCEDURE — 83935 ASSAY OF URINE OSMOLALITY: CPT | Performed by: INTERNAL MEDICINE

## 2019-12-14 PROCEDURE — 84295 ASSAY OF SERUM SODIUM: CPT | Performed by: INTERNAL MEDICINE

## 2019-12-14 PROCEDURE — 25000125 ZZHC RX 250: Performed by: INTERNAL MEDICINE

## 2019-12-14 PROCEDURE — 80048 BASIC METABOLIC PNL TOTAL CA: CPT | Performed by: INTERNAL MEDICINE

## 2019-12-14 PROCEDURE — 81001 URINALYSIS AUTO W/SCOPE: CPT | Performed by: INTERNAL MEDICINE

## 2019-12-14 PROCEDURE — 84443 ASSAY THYROID STIM HORMONE: CPT | Performed by: INTERNAL MEDICINE

## 2019-12-14 PROCEDURE — 83735 ASSAY OF MAGNESIUM: CPT | Performed by: INTERNAL MEDICINE

## 2019-12-14 PROCEDURE — 25800030 ZZH RX IP 258 OP 636: Performed by: INTERNAL MEDICINE

## 2019-12-14 RX ADMIN — Medication 10 MEQ: at 16:05

## 2019-12-14 RX ADMIN — Medication 10 MEQ: at 14:08

## 2019-12-14 RX ADMIN — ATENOLOL 25 MG: 25 TABLET ORAL at 11:58

## 2019-12-14 RX ADMIN — SODIUM PHOSPHATE, MONOBASIC, MONOHYDRATE 15 MMOL: 276; 142 INJECTION, SOLUTION INTRAVENOUS at 09:16

## 2019-12-14 RX ADMIN — SIMVASTATIN 20 MG: 20 TABLET, FILM COATED ORAL at 08:15

## 2019-12-14 RX ADMIN — ACETAMINOPHEN 650 MG: 325 TABLET, FILM COATED ORAL at 09:06

## 2019-12-14 RX ADMIN — LOSARTAN POTASSIUM 100 MG: 100 TABLET, FILM COATED ORAL at 08:15

## 2019-12-14 RX ADMIN — LEVOTHYROXINE SODIUM 112 MCG: 112 TABLET ORAL at 08:15

## 2019-12-14 ASSESSMENT — ACTIVITIES OF DAILY LIVING (ADL)
ADLS_ACUITY_SCORE: 13

## 2019-12-14 NOTE — PLAN OF CARE
Pt up independently, activity encouraged. VSS. Denies pain. Tele sinus wendy, BBB, prolonged QT. +BS. Compazine given for nausea relief.

## 2019-12-14 NOTE — PLAN OF CARE
[id number]:  VS: BP elevated but stable. C/o mild pain, no PRNs given for pain.   GI: BS hypoactive, passing flatus. No BMs since admission. Poor appetite. C/o persistent nausea, zofran x1 with minimal relief. Compazine ordered this evening.   LS: Clear, denies shortness of breath. No cough noted.   : Voiding without issue.   Neuro: Alert and oriented x4. CMS intact.   Mobility: Up independently.   Disposition: TBD.

## 2019-12-14 NOTE — PLAN OF CARE
[id number]:  VS: BP elevated but stable. Remains bradycardic in 50s. EKG completed today. Afebrile. Denies pain.   GI: BS active, passing flatus. No BM. Denied nausea all day but has no appetite. Very poor PO intake for multiple days.   LS: Clear. Denies shortness of breath.   : Voiding without issue.   Neuro: Alert and oriented x4. CMS intact.   Mobility: Up independently.   Disposition: TBD.

## 2019-12-14 NOTE — PROGRESS NOTES
[facility]  Hospitalist Progress Note    Assessment & Plan   Cande Glynn is a 69 year old female with a past medical history of hypertension and thyroid disease who was admitted on 12/12/2019 with gastroenteritis and electrolyte abnormalities.  EKG initially had shown frequent PVCs and a new left bundle branch block.  However, aside of the left annoying block was old in nature.  She was given amiodarone once in the ER.  She was monitored on telemetry.  PVCs resolved as electrolytes were replaced case had been discussed with cardiology.  On admission patient had had frequent diarrhea during hospitalization symptom had resolved.  Was unable to collect stool sample.  She was supported symptomatically with IV fluids and antiemetics.    Viral gastroenteritis  - Continues to have some nausea at times but tolearting some food  -Discontinue antiemetics due to QTc prolongation  -Collect stool studies if able but no BM >48 hours during hospitalization    Left bundle branch block with frequent PVCs; Prolonged QTc  -Reviewed telemetry monitoring-patient is bradycardic with a prolonged QTC - persistent  -PVCs resolved with electrolyte replacement  -Check EKG/Telemetry for improvement in QTc     Hyponatremia; SIADH  - Likely has some mild chronic hyponatremia.  Of note when her sodium was 120 and she is on hydrochlorothiazide which is since been discontinued.  -Na decreased overnight. Urine Na and Urine osm reviewed indicating likely SIADH  -Recheck Na with phosphorus recheck   -TSH ordered  -Continue to monitor    Hypothyroidism  -Levothyroxine; check TSH     Hypertension  -Atenolol and Cozaar - monitor bradycardia - HR in high 50s asymptomatic     Dyslipidemia  -Home Zocor continued    FEN: Oral hydration; Monitor - hypophosphatemia - replace per protocol; Regular  Activity: As tolerated  DVT Prophylaxis: Pneumatic Compression Devices  Code Status: Full Code     Expected discharge: Tomorrow, recommended to  prior living arrangement once tolerating diet.    Mamie LOVETT Kaur, DO  [employer] (7am - 6pm, M-F)    Interval History   Feeling better today.  She did have one episode of loose stools a small volume.  No blood in the stool.  No increased urinary frequency or dysuria.  No shortness of breath cough or congestion.  No abdominal pain.  Tolerating some oral food intake but taking it slow.  Nausea without vomiting at times but improving. No chest pain or palpitations.  at beside and updated.     -Data reviewed today: I reviewed all new labs and imaging results over the last 24 hours.    Physical Exam   Temp: 98.4  F (36.9  C) Temp src: Oral BP: (!) 153/79   Heart Rate: 57 Resp: 16 SpO2: 94 % O2 Device: None (Room air)    Vitals:    12/12/19 0124 12/12/19 1023   Weight: 59 kg (130 lb) 58.7 kg (129 lb 6.6 oz)     Vital Signs with Ranges  Temp:  [95.1  F (35.1  C)-98.4  F (36.9  C)] 98.4  F (36.9  C)  Heart Rate:  [57-58] 57  Resp:  [16] 16  BP: (145-167)/(66-81) 153/79  SpO2:  [94 %-98 %] 94 %  No intake/output data recorded.    Constitutional: Awake, alert, cooperative, no apparent distress. Non-toxic. Appears stated age.   HEENT: Atraumatic. Normocephalic. Conjunctiva non-injected. Sclera anicteric. MMM.   Respiratory: Moves air bilaterally. Clear to auscultation bilaterally, no crackles or wheezing  Cardiovascular: Bradycardia and regular rhythm, normal S1 and S2, and no murmur noted  GI: Normal bowel sounds, soft, non-distended, non-tender  Skin/Integumen: No rashes, no cyanosis, no edema    Medications       atenolol  25 mg Oral Daily     levothyroxine  112 mcg Oral Daily     losartan  100 mg Oral Daily     simvastatin  20 mg Oral Daily     sodium chloride (PF)  3 mL Intracatheter Q8H     Data   Recent Labs   Lab 12/14/19  0627 12/13/19  0749 12/12/19  2056  12/12/19  0219  12/09/19  1612   WBC  --   --   --   --  5.4  --  3.2*   HGB  --   --   --   --  13.0  --  15.0   MCV  --   --   --   --  87  --  85   PLT  --    --   --   --  167  --  200   * 126*  --   --  128*   < > 118*   POTASSIUM 3.9 3.9 3.7   < > 2.8*   < > 2.8*   CHLORIDE 90* 97  --   --  94   < > 81*   CO2 24 22  --   --  26   < > 27   BUN 6* 2*  --   --  5*   < > 10   CR 0.67 0.62  --   --  0.64   < > 0.69   ANIONGAP 8 7  --   --  8   < > 10   ZORAIDA 7.9* 7.4*  --   --  7.9*   < > 8.4*   * 130*  --   --  120*   < > 110*   ALBUMIN  --   --   --   --  3.4  --  4.0   PROTTOTAL  --   --   --   --  6.5*  --  7.5   BILITOTAL  --   --   --   --  0.3  --  0.6   ALKPHOS  --   --   --   --  78  --  88   ALT  --   --   --   --  19  --  19   AST  --   --   --   --  23  --  28   LIPASE  --   --   --   --   --   --  89   TROPI  --   --   --   --  <0.015  --   --     < > = values in this interval not displayed.     No results found for this or any previous visit (from the past 24 hour(s)).

## 2019-12-15 VITALS
WEIGHT: 129.41 LBS | OXYGEN SATURATION: 95 % | SYSTOLIC BLOOD PRESSURE: 152 MMHG | BODY MASS INDEX: 22.09 KG/M2 | HEIGHT: 64 IN | HEART RATE: 52 BPM | TEMPERATURE: 97.3 F | DIASTOLIC BLOOD PRESSURE: 79 MMHG | RESPIRATION RATE: 16 BRPM

## 2019-12-15 LAB
ANION GAP SERPL CALCULATED.3IONS-SCNC: 10 MMOL/L (ref 3–14)
BUN SERPL-MCNC: 9 MG/DL (ref 7–30)
CALCIUM SERPL-MCNC: 8.1 MG/DL (ref 8.5–10.1)
CHLORIDE SERPL-SCNC: 91 MMOL/L (ref 94–109)
CO2 SERPL-SCNC: 22 MMOL/L (ref 20–32)
CREAT SERPL-MCNC: 0.66 MG/DL (ref 0.52–1.04)
GFR SERPL CREATININE-BSD FRML MDRD: >90 ML/MIN/{1.73_M2}
GLUCOSE SERPL-MCNC: 96 MG/DL (ref 70–99)
PHOSPHATE SERPL-MCNC: 2.9 MG/DL (ref 2.5–4.5)
POTASSIUM SERPL-SCNC: 3.6 MMOL/L (ref 3.4–5.3)
SODIUM SERPL-SCNC: 123 MMOL/L (ref 133–144)

## 2019-12-15 PROCEDURE — 25000132 ZZH RX MED GY IP 250 OP 250 PS 637: Performed by: INTERNAL MEDICINE

## 2019-12-15 PROCEDURE — 36415 COLL VENOUS BLD VENIPUNCTURE: CPT | Performed by: INTERNAL MEDICINE

## 2019-12-15 PROCEDURE — 99239 HOSP IP/OBS DSCHRG MGMT >30: CPT | Performed by: INTERNAL MEDICINE

## 2019-12-15 PROCEDURE — 80048 BASIC METABOLIC PNL TOTAL CA: CPT | Performed by: INTERNAL MEDICINE

## 2019-12-15 PROCEDURE — 84100 ASSAY OF PHOSPHORUS: CPT | Performed by: INTERNAL MEDICINE

## 2019-12-15 RX ADMIN — ATENOLOL 25 MG: 25 TABLET ORAL at 08:10

## 2019-12-15 RX ADMIN — LOSARTAN POTASSIUM 100 MG: 100 TABLET, FILM COATED ORAL at 08:10

## 2019-12-15 RX ADMIN — LEVOTHYROXINE SODIUM 112 MCG: 112 TABLET ORAL at 08:10

## 2019-12-15 RX ADMIN — POTASSIUM CHLORIDE 20 MEQ: 1500 TABLET, EXTENDED RELEASE ORAL at 08:11

## 2019-12-15 RX ADMIN — SIMVASTATIN 20 MG: 20 TABLET, FILM COATED ORAL at 08:10

## 2019-12-15 ASSESSMENT — ACTIVITIES OF DAILY LIVING (ADL)
ADLS_ACUITY_SCORE: 13

## 2019-12-15 NOTE — PLAN OF CARE
Pt admitted with diarrhea, resolved now. Poor appetite but able to tolerate small meals without nausea. No emesis or nausea noted today. VSS. Afebrile. Denies pain. AVS reviewed with patient and her son. All questions answered. All belongings packed and sent with patient at time of discharged. Pt given wheelchair escort by nursing staff to son's personal vehicle.

## 2019-12-15 NOTE — DISCHARGE SUMMARY
[facility]  Discharge Summary Hospitalist    Date of Admission:  12/12/2019  Date of Discharge:  12/15/2019 11:54 AM  Discharging Provider: Mamie Dietrich DO  Date of Service (when I saw the patient): 12/15/19    Discharge Diagnoses   Viral gastroenteritis  Left bundle branch block  Prolonged QTC  Hypomagnesemia   Hypokalemia  Hypophosphatemia  Hyponatremia  SIADH  Hypothyroidism  Hypertension tension  Dyslipidemia    History of Present Illness   Cande Glynn is a 69 year old female with a past medical history of hypertension and thyroid disease who was admitted on 12/12/2019 with gastroenteritis and electrolyte abnormalities.     Hospital Course   Cande Glynn was admitted on 12/12/2019.  The following problems were addressed during her hospitalization:    Viral gastroenteritis: Patient had frequent loose runny stools at home.  She had nausea without vomiting.  And poor oral intake.  She was dehydrated.  During hospitalization her loose stools have resolved.  Unable to collect stool sample for C. difficile and enteric testing.  Her diet was advanced slowly encouraged to continue a bland diet and monitor for symptoms of diarrhea and abdominal discomfort.    Mild hyponatremia: SIADH: Patient's had some chronic hyponatremia and previously was on hydrochlorothiazide.  This medication has been discontinued.  On admission her sodium was 127 and discharge is 123.  Urine sodium was 105 urine osmolality was 532 indicating SIADH.  This is likely due to poor solute intake, fluids, and SIADH.  She is asymptomatic with her mild hyponatremia at this level.  Prior hospitalization her sodium was at 118.  She will need outpatient follow-up for sodium checks.    Hypothyroidism: Due to her hyponatremia her TSH and free T4 levels were checked.  Her TSH was 6.48 and free T4 was 1.41.  Recommended outpatient repeating thyroid function tests in 3 months and consider dose adjustment of  medications.    Hypokalemia/hypophosphatemia: Patient had significant electrolyte derangement secondary to her loose stools.  They replaced while hospitalized.  Will need outpatient monitoring to make sure that they are adequately remaining within normal limits.    Prolonged QTC: Patient had prolonged QTC greater than 500 on EKG.  She was monitored on telemetry without cardiac abnormalities.  She was bradycardic but this is likely secondary to atenolol.  She had PVCs initially on admission which was resolved with electrolyte replacement.  Recommend repeat EKG with PCP and discontinued home Zofran.    Mamie Dietrich, DO    Significant Results and Procedures   See below    Pending Results   These results will be followed up by PCP  Unresulted Labs Ordered in the Past 30 Days of this Admission     Date and Time Order Name Status Description    12/14/2019 0627 T4 free In process         Code Status   Full Code       Primary Care Physician   Vivien Wagner    Physical Exam   Temp: 97.3  F (36.3  C) Temp src: Oral BP: (!) 152/79   Heart Rate: 60 Resp: 16 SpO2: 95 % O2 Device: None (Room air)    Vitals:    12/12/19 0124 12/12/19 1023   Weight: 59 kg (130 lb) 58.7 kg (129 lb 6.6 oz)     Vital Signs with Ranges  Temp:  [97.3  F (36.3  C)-98.4  F (36.9  C)] 97.3  F (36.3  C)  Heart Rate:  [57-60] 60  Resp:  [16] 16  BP: (152-160)/(74-79) 152/79  SpO2:  [94 %-95 %] 95 %  I/O last 3 completed shifts:  In: 261 [I.V.:261]  Out: -     Constitutional: Awake, alert, cooperative, no apparent distress. Non-toxic. Appears stated age.   HEENT: Atraumatic. Normocephalic. Conjunctiva non-injected. Sclera anicteric. MMM.   Respiratory: Moves air bilaterally. Clear to auscultation bilaterally, no crackles or wheezing  Cardiovascular: Bradycardia and regular rhythm, normal S1 and S2, and no murmur noted  GI: Normal bowel sounds, soft, non-distended, non-tender  Skin/Integumen: No rashes, no cyanosis, no edema    Discharge Disposition    Discharged to home  Condition at discharge: Stable    Consultations This Hospital Stay   None    Time Spent on this Encounter   IMamie DO, personally saw the patient today and spent greater than 30 minutes discharging this patient.    Discharge Orders      Reason for your hospital stay    Viral gastritis with electrolyte abnormalities     Follow-up and recommended labs and tests     Follow up with primary care provider, Vivien Wagner, within 7 days for hospital follow- up.  The following labs/tests are recommended: bmp, phosphorus, EKG. Blood pressure elevated while hospitalized. Denies BP issues with outpatient PCP appointments. Recheck at appointment. If elevated consider home BP monitoring vs medication changes.     Activity    Your activity upon discharge: activity as tolerated     Full Code     Diet    Follow this diet upon discharge:  Regular Diet Adult - encourage slow advancement to regular diet starting with bland foods and monitoring diarrhea     Discharge Medications   Current Discharge Medication List      CONTINUE these medications which have NOT CHANGED    Details   atenolol (TENORMIN) 25 MG tablet Take 25 mg by mouth daily      Levothyroxine Sodium (LEVOTHROID PO) Take 112 mcg by mouth daily       losartan (COZAAR) 100 MG tablet Take 1 tablet (100 mg) by mouth daily  Qty: 30 tablet, Refills: 0    Associated Diagnoses: Benign essential hypertension      simvastatin (ZOCOR) 20 MG tablet Take 20 mg by mouth daily         STOP taking these medications       ondansetron (ZOFRAN-ODT) 4 MG ODT tab Comments:   Reason for Stopping:             Allergies   No Known Allergies  Data   Most Recent 3 CBC's:  Recent Labs   Lab Test 12/12/19  0219 12/09/19  1612 10/19/16  1503   WBC 5.4 3.2* 9.0   HGB 13.0 15.0 15.4   MCV 87 85 84    200 277      Most Recent 3 BMP's:  Recent Labs   Lab Test 12/15/19  0658 12/14/19  1822 12/14/19  0627 12/13/19  0749   * 122* 122* 126*   POTASSIUM 3.6  --   3.9 3.9   CHLORIDE 91*  --  90* 97   CO2 22  --  24 22   BUN 9  --  6* 2*   CR 0.66  --  0.67 0.62   ANIONGAP 10  --  8 7   ZORAIDA 8.1*  --  7.9* 7.4*   GLC 96  --  101* 130*     Most Recent 2 LFT's:  Recent Labs   Lab Test 12/12/19 0219 12/09/19  1612   AST 23 28   ALT 19 19   ALKPHOS 78 88   BILITOTAL 0.3 0.6     Most Recent INR's and Anticoagulation Dosing History:  Anticoagulation Dose History     There is no flowsheet data to display.        Most Recent 3 Troponin's:  Recent Labs   Lab Test 12/12/19 0219   TROPI <0.015     Most Recent Cholesterol Panel:No lab results found.  Most Recent 6 Bacteria Isolates From Any Culture (See EPIC Reports for Culture Details):No lab results found.  Most Recent TSH, T4 and A1c Labs:  Recent Labs   Lab Test 12/14/19  0627   TSH 6.48*   T4 1.41     Results for orders placed or performed during the hospital encounter of 12/12/19   Chest XR,  PA & LAT    Narrative    EXAM: XR CHEST 2 VW  LOCATION: [facility]  DATE/TIME: 12/12/2019 3:07 AM    INDICATION: Cough.  COMPARISON: 12/09/2019.    FINDINGS: The heart size is normal. The lungs are mildly hyperinflated but clear. No pneumothorax or pleural effusion. Degenerative disease and prominent kyphosis in the thoracic spine.      Impression    IMPRESSION: No acute abnormality.

## 2019-12-16 LAB — INTERPRETATION ECG - MUSE: NORMAL

## 2019-12-17 LAB — INTERPRETATION ECG - MUSE: NORMAL

## 2022-11-22 ENCOUNTER — HOSPITAL ENCOUNTER (EMERGENCY)
Facility: CLINIC | Age: 72
Discharge: LEFT BEFORE DISCHARGE | End: 2022-11-22
Attending: EMERGENCY MEDICINE
Payer: COMMERCIAL

## 2022-11-22 VITALS
RESPIRATION RATE: 16 BRPM | TEMPERATURE: 96.7 F | HEART RATE: 86 BPM | DIASTOLIC BLOOD PRESSURE: 80 MMHG | OXYGEN SATURATION: 93 % | SYSTOLIC BLOOD PRESSURE: 96 MMHG

## 2022-11-22 ASSESSMENT — ACTIVITIES OF DAILY LIVING (ADL): ADLS_ACUITY_SCORE: 33

## 2022-11-22 NOTE — ED TRIAGE NOTES
Lightheadedness, nausea and weakness since Friday. Reports history of hospitalization in 2019 for electrolyte imbalance. BP 96/80 in triage, endorses decreased appetite.

## 2025-01-24 NOTE — PLAN OF CARE
PRIMARY DIAGNOSIS: Nausea     OUTPATIENT/OBSERVATION GOALS TO BE MET BEFORE DISCHARGE  1. Orthostatic performed: N/A     2. Tolerating PO fluid and/or antibiotics (if applicable): Yes     3. Nausea/Vomiting/Diarrhea symptoms improved: Yes     4. Pain status: Denies pain     5. Return to near baseline physical activity: Yes     Discharge Planner Nurse   Safe discharge environment identified: Yes  Barriers to discharge: No  Vitals are Temp: 97.9  F (36.6  C) Temp src: Oral BP: 133/48 Pulse: 59 Heart Rate: 54 Resp: 16 SpO2: 96 %.  Patient is Alert and Oriented x4. They are independent with no assistive devices .   and   Patient is on Droplet precuations for Rule out of Pertussis.  Pt is a Regular diet.  They are denying pain.  Patient has Normal Saline 0.9% running at 50 mL per hour. Potassium was 3.2, replaced w/ recheck in the morning labs. Slept throughout the night. Denies nausea, denies pain. Serial recheck of sodium for hyponatremia w/ last check 126. Discharge TBD. Continue POC.    Please review provider order for any additional goals.   Nurse to notify provider when observation goals have been met and patient is ready for discharge.  
PRIMARY DIAGNOSIS: Nausea     OUTPATIENT/OBSERVATION GOALS TO BE MET BEFORE DISCHARGE  1. Orthostatic performed: N/A    2. Tolerating PO fluid and/or antibiotics (if applicable): Yes    3. Nausea/Vomiting/Diarrhea symptoms improved: Yes    4. Pain status: Pain free.    5. Return to near baseline physical activity: Yes    Discharge Planner Nurse   Safe discharge environment identified: Yes  Barriers to discharge: No       Entered by: Joelle Trujillo 12/10/2019 9:37 AM    Pt alert and orientated. VSS. Tolerating PO meds and diet. Up Independently. IVF running per orders. Awaiting results regarding pertussis. Plan: Possible discharge today.     Please review provider order for any additional goals.   Nurse to notify provider when observation goals have been met and patient is ready for discharge.  
PRIMARY DIAGNOSIS: Nausea    OUTPATIENT/OBSERVATION GOALS TO BE MET BEFORE DISCHARGE  1. Orthostatic performed: N/A    2. Tolerating PO fluid and/or antibiotics (if applicable): Yes    3. Nausea/Vomiting symptoms improved: No, nausea continuous, scop patch in place     4. Pain status: Pain free.    5. Return to near baseline physical activity: Yes    Discharge Planner Nurse   Safe discharge environment identified: Yes  Barriers to discharge: Yes       Entered by: Lona Matthew 12/09/2019 9:37 PM     A&Ox4, independent, regular diet, scop patch, skin intact    Please review provider order for any additional goals.   Nurse to notify provider when observation goals have been met and patient is ready for discharge.  
PRIMARY DIAGNOSIS: Nausea    OUTPATIENT/OBSERVATION GOALS TO BE MET BEFORE DISCHARGE  1. Orthostatic performed: N/A    2. Tolerating PO fluid and/or antibiotics (if applicable): Yes    3. Nausea/Vomiting/Diarrhea symptoms improved: Yes    4. Pain status: Denies pain    5. Return to near baseline physical activity: Yes    Discharge Planner Nurse   Safe discharge environment identified: Yes  Barriers to discharge: No     Please review provider order for any additional goals.   Nurse to notify provider when observation goals have been met and patient is ready for discharge.  
Patient's After Visit Summary was reviewed with patient and/or spouse.   Patient verbalized understanding of After Visit Summary, recommended follow up and was given an opportunity to ask questions.   Discharge medications sent home with patient/family: No -- Sent to preferred pharmacy  Discharged with spouse    OBSERVATION patient END time: 12:34 PM        
ROOM # 205    Living Situation (if not independent, order SW consult): Independent with spouse   Facility name:  :     Activity level at baseline: Independent  Activity level on admit: Independent      Patient registered to observation; given Patient Bill of Rights; given the opportunity to ask questions about observation status and their plan of care.  Patient has been oriented to the observation room, bathroom and call light is in place.    Discussed discharge goals and expectations with patient/family.           
no